# Patient Record
Sex: MALE | Race: WHITE | Employment: OTHER | URBAN - METROPOLITAN AREA
[De-identification: names, ages, dates, MRNs, and addresses within clinical notes are randomized per-mention and may not be internally consistent; named-entity substitution may affect disease eponyms.]

---

## 2019-05-07 ENCOUNTER — HOSPITAL ENCOUNTER (OUTPATIENT)
Dept: PULMONOLOGY | Age: 84
Discharge: HOME | End: 2019-05-07
Payer: MEDICARE

## 2019-05-07 ENCOUNTER — HOSPITAL ENCOUNTER (OUTPATIENT)
Dept: GENERAL RADIOLOGY | Age: 84
Discharge: HOME | End: 2019-05-07
Payer: MEDICARE

## 2019-05-07 VITALS — OXYGEN SATURATION: 91 %

## 2019-05-07 DIAGNOSIS — R06.09 DOE (DYSPNEA ON EXERTION): ICD-10-CM

## 2019-05-07 PROCEDURE — 94726 PLETHYSMOGRAPHY LUNG VOLUMES: CPT

## 2019-05-07 PROCEDURE — 94060 EVALUATION OF WHEEZING: CPT

## 2019-05-07 PROCEDURE — 94760 N-INVAS EAR/PLS OXIMETRY 1: CPT

## 2019-05-07 PROCEDURE — 6370000000 HC RX 637 (ALT 250 FOR IP): Performed by: FAMILY MEDICINE

## 2019-05-07 PROCEDURE — 94729 DIFFUSING CAPACITY: CPT

## 2019-05-07 PROCEDURE — 71046 X-RAY EXAM CHEST 2 VIEWS: CPT

## 2019-05-07 RX ORDER — ALBUTEROL SULFATE 90 UG/1
4 AEROSOL, METERED RESPIRATORY (INHALATION) ONCE
Status: COMPLETED | OUTPATIENT
Start: 2019-05-07 | End: 2019-05-07

## 2019-05-07 RX ADMIN — Medication 4 PUFF: at 08:31

## 2019-05-08 LAB
DLCO %PRED: 27 %
DLCO PRED: NORMAL ML/MIN/MMHG
DLCO/VA %PRED: NORMAL %
DLCO/VA PRED: NORMAL ML/MIN/MMHG
DLCO/VA: NORMAL ML/MIN/MMHG
DLCO: NORMAL ML/MIN/MMHG
EXPIRATORY TIME: NORMAL SEC
FEF 25-75% %PRED-PRE: NORMAL L/SEC
FEF 25-75% PRED: NORMAL L/SEC
FEF 25-75%-PRE: NORMAL L/SEC
FEV1 %PRED-PRE: 54 %
FEV1 PRED: NORMAL L
FEV1/FVC %PRED-PRE: NORMAL %
FEV1/FVC PRED: NORMAL %
FEV1/FVC: 72 %
FEV1: NORMAL L
FVC %PRED-PRE: NORMAL %
FVC PRED: NORMAL L
FVC: NORMAL L
GAW %PRED: NORMAL %
GAW PRED: NORMAL L/S/CMH2O
GAW: NORMAL L/S/CMH2O
IC %PRED: NORMAL %
IC PRED: NORMAL L
IC: NORMAL L
MVV %PRED-PRE: NORMAL %
MVV PRED: NORMAL L/MIN
MVV-PRE: NORMAL L/MIN
PEF %PRED-PRE: NORMAL L/SEC
PEF PRED: NORMAL L/SEC
PEF-PRE: NORMAL L/SEC
RAW %PRED: NORMAL %
RAW PRED: NORMAL CMH2O/L/S
RAW: NORMAL CMH2O/L/S
RV %PRED: NORMAL %
RV PRED: NORMAL L
RV: NORMAL L
SVC %PRED: NORMAL %
SVC PRED: NORMAL L
SVC: NORMAL L
TLC %PRED: 38 %
TLC PRED: NORMAL L
TLC: NORMAL L
VA %PRED: NORMAL %
VA PRED: NORMAL L
VA: NORMAL L
VTG %PRED: NORMAL %
VTG PRED: NORMAL L
VTG: NORMAL L

## 2019-05-08 ASSESSMENT — PULMONARY FUNCTION TESTS
FEV1/FVC: 72
FEV1_PERCENT_PREDICTED_PRE: 54

## 2019-05-08 NOTE — PROCEDURES
[address] [address]                               PULMONARY FUNCTION    PATIENT NAME: Sparkle Farias                :        1932  MED REC NO:   [id number]                          ROOM:  ACCOUNT NO:   [de-identified]                           ADMIT DATE: 2019  PROVIDER:     Aly Rendon MD    DATE OF PROCEDURE:  2019    This is an 80-year-old male. TEST PERFORMED:  1. Spirometry of flow-volume loops obtained before and after  bronchodilation. 2.  Lung volumes by plethysmography. 3.  Diffusion capacity of carbon monoxide. Test meets ATS criteria and the quality of flow-volume loops is  sufficient for interpretation. Good patient effort. The FEV1 is 1.21 L or 54% predicted. The FEV1 to FVC ratio is 72. Postbronchodilator, the FEV1 changed to 1.16 L or 52% predicted. Total  lung capacity is 38% predicted and diffusion is 27% predicted. INTERPRETATION:  1. Moderately severe obstruction without significant postbronchodilator  improvement. 2.  Severe reduction in lung volumes consistent with a restrictive  abnormality. 3.  Severely impaired diffusion capacity. 4.  Mixed obstruction and restriction, clinical correlation recommended. Please note that lack of bronchodilator response does not preclude the  clinical use of bronchodilators.         Chel Jj MD    D: 2019 18:47:13       T: 2019 0:06:00     [email or web address]  Job#: [id number]     Doc#: [id number]    CC:

## 2019-10-29 ENCOUNTER — HOSPITAL ENCOUNTER (OUTPATIENT)
Dept: CARDIAC REHAB | Age: 84
Setting detail: THERAPIES SERIES
Discharge: HOME | End: 2019-10-29
Payer: MEDICARE

## 2019-11-03 ENCOUNTER — APPOINTMENT (OUTPATIENT)
Dept: GENERAL RADIOLOGY | Age: 84
DRG: 291 | End: 2019-11-03
Payer: MEDICARE

## 2019-11-03 ENCOUNTER — HOSPITAL ENCOUNTER (INPATIENT)
Age: 84
LOS: 3 days | Discharge: HOME | DRG: 291 | End: 2019-11-06
Attending: EMERGENCY MEDICINE | Admitting: INTERNAL MEDICINE
Payer: MEDICARE

## 2019-11-03 PROBLEM — Z95.2 S/P AVR (AORTIC VALVE REPLACEMENT): Status: ACTIVE | Noted: 2019-11-03

## 2019-11-03 PROBLEM — J44.9 COPD (CHRONIC OBSTRUCTIVE PULMONARY DISEASE) (HCC): Status: ACTIVE | Noted: 2019-11-03

## 2019-11-03 PROBLEM — I50.33 CHF (CONGESTIVE HEART FAILURE), NYHA CLASS II, ACUTE ON CHRONIC, DIASTOLIC (HCC): Status: ACTIVE | Noted: 2019-11-03

## 2019-11-03 LAB
A/G RATIO: 0.8 (ref 1.1–2.2)
ALBUMIN SERPL-MCNC: 3.8 G/DL (ref 3.4–5)
ALP BLD-CCNC: 163 U/L (ref 40–129)
ALT SERPL-CCNC: 27 U/L (ref 10–40)
ANION GAP SERPL CALCULATED.3IONS-SCNC: 11 MMOL/L (ref 3–16)
AST SERPL-CCNC: 38 U/L (ref 15–37)
BASE EXCESS VENOUS: 6.7 MMOL/L (ref -3–3)
BASOPHILS ABSOLUTE: 0.1 K/UL (ref 0–0.2)
BASOPHILS RELATIVE PERCENT: 1.1 %
BILIRUB SERPL-MCNC: 0.5 MG/DL (ref 0–1)
BUN BLDV-MCNC: 22 MG/DL (ref 7–20)
CALCIUM SERPL-MCNC: 9.3 MG/DL (ref 8.3–10.6)
CARBOXYHEMOGLOBIN: 1.8 % (ref 0–1.5)
CHLORIDE BLD-SCNC: 94 MMOL/L (ref 99–110)
CO2: 32 MMOL/L (ref 21–32)
CREAT SERPL-MCNC: 1.3 MG/DL (ref 0.8–1.3)
EOSINOPHILS ABSOLUTE: 0.2 K/UL (ref 0–0.6)
EOSINOPHILS RELATIVE PERCENT: 2.1 %
GFR AFRICAN AMERICAN: >60
GFR NON-AFRICAN AMERICAN: 52
GLOBULIN: 4.8 G/DL
GLUCOSE BLD-MCNC: 128 MG/DL (ref 70–99)
HCO3 VENOUS: 35.1 MMOL/L (ref 23–29)
HCT VFR BLD CALC: 39.5 % (ref 40.5–52.5)
HEMOGLOBIN: 13.3 G/DL (ref 13.5–17.5)
INR BLD: 1.15 (ref 0.86–1.14)
LYMPHOCYTES ABSOLUTE: 2.3 K/UL (ref 1–5.1)
LYMPHOCYTES RELATIVE PERCENT: 23.3 %
MCH RBC QN AUTO: 33.2 PG (ref 26–34)
MCHC RBC AUTO-ENTMCNC: 33.5 G/DL (ref 31–36)
MCV RBC AUTO: 98.9 FL (ref 80–100)
METHEMOGLOBIN VENOUS: 0.6 %
MONOCYTES ABSOLUTE: 0.9 K/UL (ref 0–1.3)
MONOCYTES RELATIVE PERCENT: 9.3 %
NEUTROPHILS ABSOLUTE: 6.3 K/UL (ref 1.7–7.7)
NEUTROPHILS RELATIVE PERCENT: 64.2 %
O2 CONTENT, VEN: 12 VOL %
O2 SAT, VEN: 59 %
O2 THERAPY: ABNORMAL
PCO2, VEN: 67.4 MMHG (ref 40–50)
PDW BLD-RTO: 13.8 % (ref 12.4–15.4)
PH VENOUS: 7.33 (ref 7.35–7.45)
PLATELET # BLD: 202 K/UL (ref 135–450)
PMV BLD AUTO: 7 FL (ref 5–10.5)
PO2, VEN: 32 MMHG (ref 25–40)
POTASSIUM REFLEX MAGNESIUM: 5.2 MMOL/L (ref 3.5–5.1)
PRO-BNP: 1637 PG/ML (ref 0–449)
PROTHROMBIN TIME: 13.1 SEC (ref 9.8–13)
RAPID INFLUENZA  B AGN: NEGATIVE
RAPID INFLUENZA A AGN: NEGATIVE
RBC # BLD: 4 M/UL (ref 4.2–5.9)
SODIUM BLD-SCNC: 137 MMOL/L (ref 136–145)
TCO2 CALC VENOUS: 37 MMOL/L
TOTAL PROTEIN: 8.6 G/DL (ref 6.4–8.2)
TROPONIN: 0.01 NG/ML
WBC # BLD: 9.8 K/UL (ref 4–11)

## 2019-11-03 PROCEDURE — 96374 THER/PROPH/DIAG INJ IV PUSH: CPT

## 2019-11-03 PROCEDURE — 99291 CRITICAL CARE FIRST HOUR: CPT

## 2019-11-03 PROCEDURE — 82803 BLOOD GASES ANY COMBINATION: CPT

## 2019-11-03 PROCEDURE — 93005 ELECTROCARDIOGRAM TRACING: CPT | Performed by: PHYSICIAN ASSISTANT

## 2019-11-03 PROCEDURE — 94150 VITAL CAPACITY TEST: CPT

## 2019-11-03 PROCEDURE — 87804 INFLUENZA ASSAY W/OPTIC: CPT

## 2019-11-03 PROCEDURE — 83880 ASSAY OF NATRIURETIC PEPTIDE: CPT

## 2019-11-03 PROCEDURE — 36415 COLL VENOUS BLD VENIPUNCTURE: CPT

## 2019-11-03 PROCEDURE — 6360000002 HC RX W HCPCS: Performed by: PHYSICIAN ASSISTANT

## 2019-11-03 PROCEDURE — 85025 COMPLETE CBC W/AUTO DIFF WBC: CPT

## 2019-11-03 PROCEDURE — 96375 TX/PRO/DX INJ NEW DRUG ADDON: CPT

## 2019-11-03 PROCEDURE — 71046 X-RAY EXAM CHEST 2 VIEWS: CPT

## 2019-11-03 PROCEDURE — 84484 ASSAY OF TROPONIN QUANT: CPT

## 2019-11-03 PROCEDURE — 85610 PROTHROMBIN TIME: CPT

## 2019-11-03 PROCEDURE — 6370000000 HC RX 637 (ALT 250 FOR IP): Performed by: PHYSICIAN ASSISTANT

## 2019-11-03 PROCEDURE — 80053 COMPREHEN METABOLIC PANEL: CPT

## 2019-11-03 PROCEDURE — 1200000000 HC SEMI PRIVATE

## 2019-11-03 PROCEDURE — 94640 AIRWAY INHALATION TREATMENT: CPT

## 2019-11-03 RX ORDER — FUROSEMIDE 10 MG/ML
40 INJECTION INTRAMUSCULAR; INTRAVENOUS ONCE
Status: COMPLETED | OUTPATIENT
Start: 2019-11-03 | End: 2019-11-03

## 2019-11-03 RX ORDER — METHYLPREDNISOLONE SODIUM SUCCINATE 125 MG/2ML
125 INJECTION, POWDER, LYOPHILIZED, FOR SOLUTION INTRAMUSCULAR; INTRAVENOUS ONCE
Status: COMPLETED | OUTPATIENT
Start: 2019-11-03 | End: 2019-11-03

## 2019-11-03 RX ORDER — IPRATROPIUM BROMIDE AND ALBUTEROL SULFATE 2.5; .5 MG/3ML; MG/3ML
1 SOLUTION RESPIRATORY (INHALATION) ONCE
Status: COMPLETED | OUTPATIENT
Start: 2019-11-03 | End: 2019-11-03

## 2019-11-03 RX ADMIN — METHYLPREDNISOLONE SODIUM SUCCINATE 125 MG: 125 INJECTION, POWDER, FOR SOLUTION INTRAMUSCULAR; INTRAVENOUS at 21:40

## 2019-11-03 RX ADMIN — IPRATROPIUM BROMIDE AND ALBUTEROL SULFATE 1 AMPULE: .5; 3 SOLUTION RESPIRATORY (INHALATION) at 21:16

## 2019-11-03 RX ADMIN — FUROSEMIDE 40 MG: 10 INJECTION, SOLUTION INTRAMUSCULAR; INTRAVENOUS at 21:40

## 2019-11-03 RX ADMIN — ALBUTEROL SULFATE 5 MG: 2.5 SOLUTION RESPIRATORY (INHALATION) at 21:19

## 2019-11-03 ASSESSMENT — ENCOUNTER SYMPTOMS
ABDOMINAL PAIN: 0
VOMITING: 0
EYES NEGATIVE: 1
COLOR CHANGE: 0
COUGH: 1
NAUSEA: 0
SHORTNESS OF BREATH: 1

## 2019-11-03 ASSESSMENT — PAIN DESCRIPTION - LOCATION
LOCATION: BACK
LOCATION: BACK

## 2019-11-03 ASSESSMENT — PAIN DESCRIPTION - FREQUENCY: FREQUENCY: INTERMITTENT

## 2019-11-03 ASSESSMENT — PAIN DESCRIPTION - PAIN TYPE
TYPE: ACUTE PAIN
TYPE: ACUTE PAIN

## 2019-11-03 ASSESSMENT — PAIN DESCRIPTION - ORIENTATION: ORIENTATION: MID

## 2019-11-03 ASSESSMENT — PAIN SCALES - GENERAL
PAINLEVEL_OUTOF10: 5
PAINLEVEL_OUTOF10: 5

## 2019-11-03 NOTE — LETTER
Beneficiary Notification Letter     This [facility] Provider is Participating in an Innovative Payment and [address] [place] [address] [place] from Medicare     Greetings:   [facility] is participating in a Medicare initiative called the [facility] for [address] [facility]. You are receiving this letter because your health care provider has identified you as a patient who is receiving care through this initiative. Health care providers participating in the [facility] [address] [facility], including [facility], will work with Medicare to improve care for patients. Your Medicare rights have not been changed. You still have all the same Medicare rights and protections, including the right to choose which hospital, doctor, or other health care provider you see. However, because [facility] chose to participate in the [address] [place] [address], all Medicare beneficiaries who meet the eligibility criteria of this initiative will receive care under the initiative. If you do not wish to receive care under the Bundled Payments [facility] [address] [facility], you must choose a health care provider that does not participate in this initiative for your care. Regardless of which health care provider you see, Medicare will continue to cover all of your medically necessary services. Bundled Payments for Care Improvement Advanced aims to help improve your care     The Bundled Payments [facility] [address] [facility] is an innovative Medicare initiative that encourages your doctors, hospitals, and other health care providers to work more closely together so you get better care during and following certain hospital stays.  In this initiative, doctors and hospitals may work closely with certain health care providers and suppliers that help patients recover after discharge from the hospital, including skilled nursing facilities, home health agencies, inpatient rehabilitation facilities, and long term care hospitals. [facility] is working closely with the doctors and other health care providers that care for you during and following your hospital stay and for a period of time after you leave the hospital. By working together, the health care providers are trying to more efficiently provide well-managed, high quality, patient-centered care as you undergo treatment. Hospitals, doctors, and other health care providers that care for you following a hospital stay may receive an additional payment for providing better, more coordinated health care. Medicare will monitor your care to make sure you and others get high quality care. Your feedback is important     Medicare may also ask you to answer a survey about the services and care you received from Kristel Carl will be mailed to you. Your feedback will improve care for all people with Medicare who receive care from [facility]. Completion of this survey is optional.     Get more information     For more information about the Bundled Payments for [address] [place] [address], you can:    · Visit the CMS BPCI Advanced Website at http://hardin-young.net/ initiatives/bpci-advanced   · Call the [facility] team at (532) [phone]. · Call 1-800-MEDICARE (7-234.971.2088). TTY users can call 9-618.867.6842     If you have concerns or complaints about your care, talk to your health care provider, or contact your Beneficiary and Family Centered Quality Improvement Organization [facility]). To get your [facility]'s phone number, visit Medicare.gov/contacts or call 1-800-MEDICARE. · To find a different hospital, visit www. hospitalcompare.[facility].gov or call 1-800[employer] MEDICARE (1-265.349.4481). TTY users should call 4-921.628.3525. · To find a different doctor, visit Medicare's Physician Compare website, HDTapes.co.nz, or call 1-800-MEDICARE (970 6350). TTY users should call 5-795.109.6376. · To find a different skilled nursing facility, visit [address] [facility] [address] website, https://www.SocietyOne/, or call 1-800-MEDICARE (1- 324.440.5119). TTY users should call 7-331.360.8527. · To find a different long term care hospital, visit [facility] [address] Compare website, SmTicieslogLinguastat.be, or call 1-800- MEDICARE (814 5196). TTY users should call 9-787.414.6281. · To find a different inpatient rehabilitation facility, visit [address] [facility] Compare website, www.medicare.gov/ inpatientrehabilitation facilitycompare, or call 1-800-MEDICARE (7-866.462.5166). TTY users should call 0- 593.195.3143. · To find a different home health agency, visit [facility] website, www.medicare.gov/homehealthcompare, or call 1-800-MEDICARE (2-391- 423-2400). TTY users should call [phone].

## 2019-11-04 LAB
ANION GAP SERPL CALCULATED.3IONS-SCNC: 13 MMOL/L (ref 3–16)
BASOPHILS ABSOLUTE: 0 K/UL (ref 0–0.2)
BASOPHILS RELATIVE PERCENT: 0.2 %
BUN BLDV-MCNC: 20 MG/DL (ref 7–20)
CALCIUM SERPL-MCNC: 9 MG/DL (ref 8.3–10.6)
CHLORIDE BLD-SCNC: 91 MMOL/L (ref 99–110)
CO2: 31 MMOL/L (ref 21–32)
CREAT SERPL-MCNC: 1.2 MG/DL (ref 0.8–1.3)
EKG ATRIAL RATE: 91 BPM
EKG DIAGNOSIS: NORMAL
EKG P AXIS: 43 DEGREES
EKG P-R INTERVAL: 228 MS
EKG Q-T INTERVAL: 382 MS
EKG QRS DURATION: 126 MS
EKG QTC CALCULATION (BAZETT): 469 MS
EKG R AXIS: -54 DEGREES
EKG T AXIS: 19 DEGREES
EKG VENTRICULAR RATE: 91 BPM
EOSINOPHILS ABSOLUTE: 0 K/UL (ref 0–0.6)
EOSINOPHILS RELATIVE PERCENT: 0.1 %
GFR AFRICAN AMERICAN: >60
GFR NON-AFRICAN AMERICAN: 57
GLUCOSE BLD-MCNC: 183 MG/DL (ref 70–99)
HCT VFR BLD CALC: 39.9 % (ref 40.5–52.5)
HEMOGLOBIN: 13.6 G/DL (ref 13.5–17.5)
LYMPHOCYTES ABSOLUTE: 0.3 K/UL (ref 1–5.1)
LYMPHOCYTES RELATIVE PERCENT: 3.4 %
MCH RBC QN AUTO: 33.5 PG (ref 26–34)
MCHC RBC AUTO-ENTMCNC: 34.1 G/DL (ref 31–36)
MCV RBC AUTO: 98.3 FL (ref 80–100)
MONOCYTES ABSOLUTE: 0.1 K/UL (ref 0–1.3)
MONOCYTES RELATIVE PERCENT: 1.4 %
NEUTROPHILS ABSOLUTE: 8.2 K/UL (ref 1.7–7.7)
NEUTROPHILS RELATIVE PERCENT: 94.9 %
PDW BLD-RTO: 14.1 % (ref 12.4–15.4)
PLATELET # BLD: 193 K/UL (ref 135–450)
PMV BLD AUTO: 7.3 FL (ref 5–10.5)
POTASSIUM REFLEX MAGNESIUM: 3.9 MMOL/L (ref 3.5–5.1)
PROCALCITONIN: 0.06 NG/ML (ref 0–0.15)
RBC # BLD: 4.06 M/UL (ref 4.2–5.9)
SODIUM BLD-SCNC: 135 MMOL/L (ref 136–145)
TROPONIN: <0.01 NG/ML
TROPONIN: <0.01 NG/ML
WBC # BLD: 8.7 K/UL (ref 4–11)

## 2019-11-04 PROCEDURE — 2580000003 HC RX 258: Performed by: NURSE PRACTITIONER

## 2019-11-04 PROCEDURE — 1200000000 HC SEMI PRIVATE

## 2019-11-04 PROCEDURE — 99222 1ST HOSP IP/OBS MODERATE 55: CPT | Performed by: INTERNAL MEDICINE

## 2019-11-04 PROCEDURE — 6370000000 HC RX 637 (ALT 250 FOR IP): Performed by: NURSE PRACTITIONER

## 2019-11-04 PROCEDURE — 84484 ASSAY OF TROPONIN QUANT: CPT

## 2019-11-04 PROCEDURE — 80048 BASIC METABOLIC PNL TOTAL CA: CPT

## 2019-11-04 PROCEDURE — 93010 ELECTROCARDIOGRAM REPORT: CPT | Performed by: INTERNAL MEDICINE

## 2019-11-04 PROCEDURE — 94761 N-INVAS EAR/PLS OXIMETRY MLT: CPT

## 2019-11-04 PROCEDURE — 84145 PROCALCITONIN (PCT): CPT

## 2019-11-04 PROCEDURE — 6360000002 HC RX W HCPCS: Performed by: NURSE PRACTITIONER

## 2019-11-04 PROCEDURE — 2700000000 HC OXYGEN THERAPY PER DAY

## 2019-11-04 PROCEDURE — 6370000000 HC RX 637 (ALT 250 FOR IP)

## 2019-11-04 PROCEDURE — 85025 COMPLETE CBC W/AUTO DIFF WBC: CPT

## 2019-11-04 RX ORDER — FUROSEMIDE 40 MG/1
40 TABLET ORAL DAILY
Status: DISCONTINUED | OUTPATIENT
Start: 2019-11-04 | End: 2019-11-06 | Stop reason: HOSPADM

## 2019-11-04 RX ORDER — SODIUM CHLORIDE 0.9 % (FLUSH) 0.9 %
10 SYRINGE (ML) INJECTION EVERY 12 HOURS SCHEDULED
Status: DISCONTINUED | OUTPATIENT
Start: 2019-11-04 | End: 2019-11-06 | Stop reason: HOSPADM

## 2019-11-04 RX ORDER — M-VIT,TX,IRON,MINS/CALC/FOLIC 27MG-0.4MG
1 TABLET ORAL DAILY
Status: DISCONTINUED | OUTPATIENT
Start: 2019-11-04 | End: 2019-11-06 | Stop reason: HOSPADM

## 2019-11-04 RX ORDER — IPRATROPIUM BROMIDE AND ALBUTEROL SULFATE 2.5; .5 MG/3ML; MG/3ML
1 SOLUTION RESPIRATORY (INHALATION)
Status: DISCONTINUED | OUTPATIENT
Start: 2019-11-04 | End: 2019-11-04

## 2019-11-04 RX ORDER — PREDNISONE 20 MG/1
40 TABLET ORAL DAILY
Status: DISCONTINUED | OUTPATIENT
Start: 2019-11-04 | End: 2019-11-06 | Stop reason: HOSPADM

## 2019-11-04 RX ORDER — CHOLECALCIFEROL (VITAMIN D3) 125 MCG
5 CAPSULE ORAL NIGHTLY PRN
Status: DISCONTINUED | OUTPATIENT
Start: 2019-11-04 | End: 2019-11-06 | Stop reason: HOSPADM

## 2019-11-04 RX ORDER — ACETAMINOPHEN 325 MG/1
650 TABLET ORAL EVERY 4 HOURS PRN
Status: DISCONTINUED | OUTPATIENT
Start: 2019-11-04 | End: 2019-11-06 | Stop reason: HOSPADM

## 2019-11-04 RX ORDER — ONDANSETRON 2 MG/ML
4 INJECTION INTRAMUSCULAR; INTRAVENOUS EVERY 6 HOURS PRN
Status: DISCONTINUED | OUTPATIENT
Start: 2019-11-04 | End: 2019-11-06 | Stop reason: HOSPADM

## 2019-11-04 RX ORDER — SIMVASTATIN 40 MG
40 TABLET ORAL NIGHTLY
Status: DISCONTINUED | OUTPATIENT
Start: 2019-11-04 | End: 2019-11-06 | Stop reason: HOSPADM

## 2019-11-04 RX ORDER — SODIUM CHLORIDE 0.9 % (FLUSH) 0.9 %
10 SYRINGE (ML) INJECTION PRN
Status: DISCONTINUED | OUTPATIENT
Start: 2019-11-04 | End: 2019-11-06 | Stop reason: HOSPADM

## 2019-11-04 RX ORDER — FERROUS GLUCONATE 324(37.5)
324 TABLET ORAL
Status: DISCONTINUED | OUTPATIENT
Start: 2019-11-04 | End: 2019-11-06 | Stop reason: HOSPADM

## 2019-11-04 RX ORDER — PANTOPRAZOLE SODIUM 40 MG/1
40 TABLET, DELAYED RELEASE ORAL
Status: DISCONTINUED | OUTPATIENT
Start: 2019-11-04 | End: 2019-11-06 | Stop reason: HOSPADM

## 2019-11-04 RX ORDER — IPRATROPIUM BROMIDE AND ALBUTEROL SULFATE 2.5; .5 MG/3ML; MG/3ML
1 SOLUTION RESPIRATORY (INHALATION) EVERY 4 HOURS PRN
Status: DISCONTINUED | OUTPATIENT
Start: 2019-11-04 | End: 2019-11-06 | Stop reason: HOSPADM

## 2019-11-04 RX ORDER — ASPIRIN 81 MG/1
81 TABLET ORAL DAILY
Status: DISCONTINUED | OUTPATIENT
Start: 2019-11-04 | End: 2019-11-06 | Stop reason: HOSPADM

## 2019-11-04 RX ADMIN — PANTOPRAZOLE SODIUM 40 MG: 40 TABLET, DELAYED RELEASE ORAL at 06:49

## 2019-11-04 RX ADMIN — Medication 10 ML: at 20:58

## 2019-11-04 RX ADMIN — Medication: at 07:55

## 2019-11-04 RX ADMIN — METOPROLOL TARTRATE 25 MG: 25 TABLET ORAL at 20:54

## 2019-11-04 RX ADMIN — Medication 10 ML: at 09:06

## 2019-11-04 RX ADMIN — FUROSEMIDE 40 MG: 40 TABLET ORAL at 09:05

## 2019-11-04 RX ADMIN — PREDNISONE 40 MG: 20 TABLET ORAL at 09:05

## 2019-11-04 RX ADMIN — ASPIRIN 81 MG: 81 TABLET ORAL at 09:05

## 2019-11-04 RX ADMIN — SIMVASTATIN 40 MG: 40 TABLET, FILM COATED ORAL at 20:54

## 2019-11-04 RX ADMIN — Medication 1 TABLET: at 09:05

## 2019-11-04 RX ADMIN — ENOXAPARIN SODIUM 40 MG: 40 INJECTION SUBCUTANEOUS at 09:05

## 2019-11-04 RX ADMIN — METOPROLOL TARTRATE 25 MG: 25 TABLET ORAL at 09:05

## 2019-11-04 RX ADMIN — Medication 324 MG: at 09:05

## 2019-11-04 ASSESSMENT — PAIN SCALES - GENERAL
PAINLEVEL_OUTOF10: 0

## 2019-11-04 NOTE — PROGRESS NOTES
adhesions, excision of sigmoid epiploica    MITRAL VALVE REPLACEMENT      PACEMAKER INSERTION         Level of Consciousness: Alert, Oriented, and Cooperative = 0    Level of Activity: Walking with assistance = 1    Respiratory Pattern: Regular Pattern; RR 8-20 = 0    Breath Sounds: Diminshed bilaterally and/or crackles = 2    Sputum   ,  ,    Cough: Strong, spontaneous, non-productive = 0    Vital Signs   /73   Pulse 99   Temp 98 °F (36.7 °C) (Oral)   Resp 18   Ht 5' 9\" (1.753 m)   Wt 175 lb 12.8 oz (79.7 kg)   SpO2 95%   BMI 25.96 kg/m²   SPO2 (COPD values may differ): 90-91% on room air or greater than 92% on FiO2 24- 28% = 1    Peak Flow (asthma only): not applicable = 0    RSI: 5-6 = Q4hr PRN (every four hours as needed) for dyspnea        Plan       Goals: medication delivery, mobilize retained secretions, volume expansion and improve oxygenation    Patient/caregiver was educated on the proper method of use for Respiratory Care Devices:  Yes      Level of patient/caregiver understanding able to:   ? Verbalize understanding   ? Demonstrate understanding       ? Teach back        ? Needs reinforcement       ? No available caregiver               ? Other:     Response to education:  [address]     Is patient being placed on Home Treatment Regimen? No     Does the patient have everything they need prior to discharge? NA     Comments: pt seen on admission and in the ED    Plan of Care: [facility] Q4 PRN     Electronically signed by Adrienne Rodriguez RCP on 11/4/2019 at 2:45 AM    Respiratory Protocol Guidelines     1. Assessment and treatment by Respiratory Therapy will be initiated for medication and therapeutic interventions upon initiation of aerosolized medication. 2. Physician will be contacted for respiratory rate (RR) greater than 35 breaths per minute. Therapy will be held for heart rate (HR) greater than 140 beats per minute, pending direction from physician.   3. Bronchodilators will be administered via Metered Dose Inhaler (MDI) with spacer when the following criteria are met:  a. Alert and cooperative     b. HR < 140 bpm  c. RR < 30 bpm                d. Can demonstrate a 2-3 second inspiratory hold  4. Bronchodilators will be administered via Hand Held Nebulizer JOV[facility]) to patients when ANY of the following criteria are met  a. Incognizant or uncooperative          b. Patients treated with HHN at Home        c. Unable to demonstrate proper use of MDI with spacer     d. RR > 30 bpm   5. Bronchodilators will be delivered via Metered Dose Inhaler (MDI), HHN, Aerogen to intubated patients on mechanical ventilation. 6. Inhalation medication orders will be delivered and/or substituted as outlined below. Aerosolized Medications Ordering and Administration Guidelines:    1. All Medications will be ordered by a physician, and their frequency and/or modality will be adjusted as defined by the patients Respiratory Severity Index (RSI) score. 2. If the patient does not have documented COPD, consider discontinuing anticholinergics when RSI is less than 9.  3. If the bronchospasm worsens (increased RSI), then the bronchodilator frequency can be increased to a maximum of every 4 hours. If greater than every 4 hours is required, the physician will be contacted. 4. If the bronchospasm improves, the frequency of the bronchodilator can be decreased, based on the patient's RSI, but not less than home treatment regimen frequency. 5. Bronchodilator(s) will be discontinued if patient has a RSI less than 9 and has received no scheduled or as needed treatment for 72  Hrs. Patients Ordered on a Mucolytic Agent:    1. Must always be administered with a bronchodilator. 2. Discontinue if patient experiences worsened bronchospasm, or secretions have lessened to the point that the patient is able to clear them with a cough. Anti-inflammatory and Combination Medications:    1.  If the patient lacks prior history

## 2019-11-04 NOTE — CARE COORDINATION
[facility]    Referral received from CM to follow for home care services. I will follow for needs, and speak with patient to verify demos. COPD . ...  May need HCV program, if needed please add this into the home care order    - Recommend patient for [address]  Work mobile: [phone]  [facility] office: [phone]

## 2019-11-04 NOTE — CONSULTS
needed for Pain    Historical Provider, MD   CO ENZYME Q-10 PO Take 1 tablet by mouth daily    Historical Provider, MD   melatonin 3 MG TABS tablet Take 5 mg by mouth daily. Historical Provider, MD   Multiple Vitamins-Minerals (THERAPEUTIC MULTIVITAMIN-MINERALS) tablet Take 1 tablet by mouth daily. Historical Provider, MD   Omega-3 Fatty Acids (FISH OIL) 1200 MG CAPS Take  by mouth. Historical Provider, MD   S[place] 500 MG CAPS Take  by mouth. Historical Provider, MD   Flaxseed, Linseed, (FLAXSEED OIL) 1200 MG CAPS Take  by mouth. Historical Provider, MD   ferrous gluconate (FERGON) 324 (38 FE) MG tablet Take 324 mg by mouth daily (with breakfast)     Historical Provider, MD   ALPHA LIPOIC ACID-CR-CINNAMON PO Take 100 mg by mouth. Historical Provider, MD   metoprolol (LOPRESSOR) 25 MG tablet Take 1 tablet by mouth 2 times daily. 2/6/14   Martha Guerrero MD   simvastatin (ZOCOR) 40 MG tablet Take 40 mg by mouth nightly. Historical Provider, MD   esomeprazole (NEXIUM) 40 MG capsule Take 40 mg by mouth every morning (before breakfast). Historical Provider, MD   nitroGLYCERIN (NITROSTAT) 0.4 MG SL tablet Place 1 tablet under the tongue every 5 minutes as needed for Chest pain for 30 days. 10/30/13 11/21/15  Prudence VanndaleMD dianna   aspirin 81 MG EC tablet Take 81 mg by mouth daily. Historical Provider, MD        Allergies:  Ambien  [zolpidem tartrate]     Review of Systems:   All 14 point review of symptoms completed. Pertinent positives identified in the HPI, all other review of symptoms negative as below.     ·     Physical Examination:    Vitals:    11/04/19 1211   BP: 112/64   Pulse: 96   Resp: 16   Temp: 97.9 °F (36.6 °C)   SpO2: 96%    Weight: 175 lb 12.8 oz (79.7 kg)         General Appearance:  Alert, cooperative, no distress, appears stated age   Head:  Normocephalic, without obvious abnormality, atraumatic   Eyes:  PERRL, conjunctiva/corneas clear       Nose: Nares normal   Throat: Lips, mucosa, and tongue normal   Neck: Supple, symmetrical, trachea midline, no carotid bruit or JVD       Lungs:    Slight bibasilar rales right greater than left, respirations unlabored. Patient on 4 L of oxygen, uses 2 at home   Chest Wall:  No tenderness or deformity   Heart:  Regular rate and rhythm, S1, S2 normal, no murmur, rub or gallop   Abdomen:   Soft, non-tender, bowel sounds active           Extremities: Extremities normal, atraumatic, no cyanosis. 1+ lower extremity edema   Pulses: 2+ and symmetric   Skin: Skin color, texture, turgor normal, no rashes or lesions   Pysch: Normal mood and affect   Neurologic: Normal gross motor and sensory exam.         Labs  CBC:   Lab Results   Component Value Date    WBC 8.7 11/04/2019    RBC 4.06 11/04/2019    HGB 13.6 11/04/2019    HCT 39.9 11/04/2019    MCV 98.3 11/04/2019    RDW 14.1 11/04/2019     11/04/2019     CMP:    Lab Results   Component Value Date     11/04/2019    K 3.9 11/04/2019    CL 91 11/04/2019    CO2 31 11/04/2019    BUN 20 11/04/2019    CREATININE 1.2 11/04/2019    GFRAA >60 11/04/2019    AGRATIO 0.8 11/03/2019    LABGLOM 57 11/04/2019    GLUCOSE 183 11/04/2019    PROT 8.6 11/03/2019    CALCIUM 9.0 11/04/2019    BILITOT 0.5 11/03/2019    ALKPHOS 163 11/03/2019    AST 38 11/03/2019    ALT 27 11/03/2019     PT/INR:  No results found for: PTINR  Lab Results   Component Value Date    TROPONINI <0.01 11/04/2019       EKG:  I have reviewed EKG with the following interpretation:  Impression: Sinus rhythm with first-degree AV block, regular rate, LAD, Bifasicular block, right bundle branch block, criteria not met for ischemia. CXR: Bibasilar atelectasis with right-sided pleural effusion.     Assessment  Patient Active Problem List   Diagnosis    Severe aortic stenosis    Laceration of head    Abnormal EKG    Acute DVT (deep venous thrombosis) (HCC)    Syncope and collapse    CAD (coronary artery disease)    Hyperlipidemia    Chest pressure    CHF (congestive heart failure), NYHA class II, acute on chronic, diastolic (HCC)    S/P AVR (aortic valve replacement)    COPD (chronic obstructive pulmonary disease) ([facility])         Plan:    Acute respiratory failure: Likely secondary to decompensated diastolic CHF, and COPD exacerbation. (Improving)  - See plan for CHF and COPD    Acute on chronic decompensated diastolic CHF:  -Continue Lasix  -Echo    COPD exacerbation:  -Per medicine    CAD:  - Continue beta-blocker, ASA, and statin. Hyperlipidemia:  -Continue home meds    Esther Rodarte DO PGY-2  11/4/2019 3:02 PM      Pt seen/examined w/ resident, I performed my own history, physical, pt presented w/ cp/sob, feels better today.   Gen: sitting in chair, nad  Cv: rrr s1 s2 2/6 sm  Neck: elev jvp  Chest: decrs bs  Abd soft  Ext: +1 le edema    Plan, diurese, get echo

## 2019-11-04 NOTE — H&P
Hospital Medicine History & Physical      PCP: William Mahajan MD    Date of Admission: 11/3/2019    Date of Service: Pt seen/examined on 11/3/2019 and Admitted to Inpatient with expected LOS greater than two midnights due to medical therapy. Chief Complaint:    Chief Complaint   Patient presents with    Shortness of Breath     pt states SOB worse this afternoon with painful deep breaths, new back pain      History Of Present Illness:      80 y.o. male, with PMH of HLD, CHF, interstitial lung disease with O2 dependence, CAD s/p CABG, aortic stenosis s/p AVR, DVT, pacemaker for bradycardia, and GERD, who presented to [facility] with increasing shortness of breath. History was obtained from the patient and review of the EMR. The patient began feeling more short of breath this afternoon progressively worsened throughout the day. He began to have some lower midsternal chest pain and pain in his back along with shortness of breath and called EMS to be brought to the ER for further evaluation. The patient states that his symptoms worsened with activity, but did not dissipate with rest.  The patient was noted to be hypoxic in the 80s with his normal 2 L of oxygen. The ED bumped him up to 4 L and this was adequate in maintaining his oxygen saturation in the 90s. The patient states that his pain in his chest and back worsens with inspiration and also with movement. Of note, the patient did have an echo on 8/14/2019 that revealed vigorous left ventricle systolic function with EF of 65 to 70%. Dynamic obstruction in the mid cavity. Abnormal left ventricular relaxation, grade 1 diastolic dysfunction. Bioprosthetic valve present in aortic valve. Moderate focal thickening and calcification of anterior leaflet and posterior leaflet of the mitral valve. Pacer wire noted in the right ventricle.     He was hospitalized in September 2019 for similar symptoms and was found to have interstitial lung disease, gluconate (FERGON) 324 (38 FE) MG tablet Take 324 mg by mouth daily (with breakfast)     Historical Provider, MD   ALPHA LIPOIC ACID-CR-CINNAMON PO Take 100 mg by mouth. Historical Provider, MD   metoprolol (LOPRESSOR) 25 MG tablet Take 1 tablet by mouth 2 times daily. 2/6/14   Liz Dias MD   simvastatin (ZOCOR) 40 MG tablet Take 40 mg by mouth nightly. Historical Provider, MD   esomeprazole (NEXIUM) 40 MG capsule Take 40 mg by mouth every morning (before breakfast). Historical Provider, MD   nitroGLYCERIN (NITROSTAT) 0.4 MG SL tablet Place 1 tablet under the tongue every 5 minutes as needed for Chest pain for 30 days. 10/30/13 11/21/15  Mary Tijerina MD   aspirin 81 MG EC tablet Take 81 mg by mouth daily. Historical Provider, MD       Allergies:  Ambien  [zolpidem tartrate]    Social History:      The patient currently lives independently. TOBACCO:   reports that he quit smoking about 47 years ago. He has never used smokeless tobacco.  ETOH:   reports that he does not drink alcohol. Family History:      Reviewed in detail. Positive as follows:        Problem Relation Age of Onset    Heart Disease Brother        REVIEW OF SYSTEMS:   Pertinent positives as noted in the HPI. All other systems reviewed and negative. PHYSICAL EXAM PERFORMED:    /73   Pulse 99   Temp 98 °F (36.7 °C) (Oral)   Resp 18   Ht 5' 9\" (1.753 m)   Wt 175 lb 12.8 oz (79.7 kg)   SpO2 95%   BMI 25.96 kg/m²     General appearance:  Pleasant, elderly male in no apparent distress, appears stated age and cooperative. HEENT:  Normal cephalic, atraumatic without obvious deformity. Pupils equal, round, and reactive to light. Extra ocular muscles intact. Conjunctivae/corneas clear. Neck: Supple, with full range of motion. No jugular venous distention. Trachea midline. Respiratory:  Increased respiratory effort. Decreased breath sounds, rales noted in bilateral bases.   Cardiovascular:  Regular rate and rhythm with normal S1/S2 without murmurs, rubs or gallops. Strong pulsation at aortic area. Abdomen: Soft, non-tender, non-distended with normal bowel sounds. Musculoskeletal:  No clubbing, cyanosis or edema bilaterally. Full range of motion without deformity. Skin: Skin color, texture, turgor normal.  No rashes or lesions. Neurologic:  Neurovascularly intact without any focal sensory/motor deficits. Cranial nerves: II-XII intact, grossly non-focal.  Psychiatric:  Alert and oriented, thought content appropriate, normal insight  Capillary Refill: Brisk,< 3 seconds   Peripheral Pulses: +2 palpable, equal bilaterally       Labs:     Recent Labs     11/03/19 2101   WBC 9.8   HGB 13.3*   HCT 39.5*        Recent Labs     11/03/19 2101      K 5.2*   CL 94*   CO2 32   BUN 22*   CREATININE 1.3   CALCIUM 9.3     Recent Labs     11/03/19 2101   AST 38*   ALT 27   BILITOT 0.5   ALKPHOS 163*     Recent Labs     11/03/19 2101   INR 1.15*     Recent Labs     11/03/19 2101 11/04/19  0017   TROPONINI 0.01 <0.01       Urinalysis:      Lab Results   Component Value Date    NITRU Negative 11/19/2014    BLOODU SMALL 11/19/2014    SPECGRAV >=1.030 11/19/2014    GLUCOSEU Negative 11/19/2014       Radiology:     CXR: I have reviewed the CXR with the following interpretation: Vascular congestion with small right effusion and basilar atelectasis. Bilateral mixed interstitial and airspace opacities. EKG:  I have reviewed the EKG with the following interpretation: NSR with first-degree AV block, possible left atrial enlargement, right bundle branch block, left anterior fascicular block. Pacer. Rate of 91. XR CHEST STANDARD (2 VW)   Preliminary Result   Vascular congestion with small right effusion and basilar atelectasis.              ASSESSMENT:    Active Hospital Problems    Diagnosis Date Noted    CHF (congestive heart failure), NYHA class II, acute on chronic, diastolic (HCC) [O87.82] 78/95/7253    S/P AVR (aortic

## 2019-11-04 NOTE — ED PROVIDER NOTES
I personally interviewed and examined this patient, discussed the findings, diagnostic studies, interventions and treatment plan with LEA. . I reviewed the clinical notes and test results. I personally supervised all pertinent procedures performed on the patient by the LEA throughout the course and was available to manage complications as they arose, if applicable. I agree with the assessment, management and disposition as presented by the LEA with exceptions/corrections as documented. Patient went to [place] this morning and states upon getting home being feeling more and more short of breath. Worse throughout the day. Is a letting pain across his lower chest and back. It is worse when he was around. Here in the ER his flu swab is negative is white blood cell count was Normal Without a bandemia. It's just for show some vascular congestion is very short of breath is hypoxic 86% on room air. I do think in light of his patient should be admitted further evaluation management and I can only be any developing a worsening respiratory infection. For further details of this emergency department encounter, please see the LEA's documentation.       ED Triage Vitals [11/03/19 2058]   BP Temp Temp Source Pulse Resp SpO2 Height Weight   (!) 140/77 97.8 °F (36.6 °C) Oral 87 22 (!) 86 % -- --        Results for orders placed or performed during the hospital encounter of 11/03/19   Rapid influenza A/B antigens   Result Value Ref Range    Rapid Influenza A Ag Negative Negative    Rapid Influenza B Ag Negative Negative   CBC Auto Differential   Result Value Ref Range    WBC 9.8 4.0 - 11.0 K/uL    RBC 4.00 (L) 4.20 - 5.90 M/uL    Hemoglobin 13.3 (L) 13.5 - 17.5 g/dL    Hematocrit 39.5 (L) 40.5 - 52.5 %    MCV 98.9 80.0 - 100.0 fL    MCH 33.2 26.0 - 34.0 pg    MCHC 33.5 31.0 - 36.0 g/dL    RDW 13.8 12.4 - 15.4 %    Platelets 814 101 - 919 K/uL    MPV 7.0 5.0 - 10.5 fL    Neutrophils % 64.2 %    Lymphocytes % 23.3 % Monocytes % 9.3 %    Eosinophils % 2.1 %    Basophils % 1.1 %    Neutrophils Absolute 6.3 1.7 - 7.7 K/uL    Lymphocytes Absolute 2.3 1.0 - 5.1 K/uL    Monocytes Absolute 0.9 0.0 - 1.3 K/uL    Eosinophils Absolute 0.2 0.0 - 0.6 K/uL    Basophils Absolute 0.1 0.0 - 0.2 K/uL   Protime-INR   Result Value Ref Range    Protime 13.1 (H) 9.8 - 13.0 sec    INR 1.15 (H) 0.86 - 1.14   Blood gas, venous   Result Value Ref Range    pH, Sixto 7.334 (L) 7.350 - 7.450    pCO2, Sixto 67.4 (H) 40.0 - 50.0 mmHg    pO2, Sixto 32.0 25.0 - 40.0 mmHg    HCO3, Venous 35.1 (H) 23.0 - 29.0 mmol/L    Base Excess, Sixto 6.7 (H) -3.0 - 3.0 mmol/L    O2 Sat, Sixto 59 Not Established %    Carboxyhemoglobin 1.8 (H) 0.0 - 1.5 %    MetHgb, Sixto 0.6 <1.5 %    TC02 (Calc), Sixto 37 Not Established mmol/L    O2 Content, Sixto 12 Not Established VOL %    O2 Therapy Unknown        XR CHEST STANDARD (2 VW)   Preliminary Result   Vascular congestion with small right effusion and basilar atelectasis. CLINICAL IMPRESSION  1. Hypoxia    2. Shortness of breath        Blood pressure (!) 140/77, pulse 87, temperature 97.8 °F (36.6 °C), temperature source Oral, resp. rate 16, SpO2 (!) 86 %. Vikash Gaona was admitted in stable condition. This chart was generated in part by using Dragon Dictation system and may contain errors related to that system including errors in grammar, punctuation, and spelling, as well as words and phrases that may be inappropriate. If there are any questions or concerns please feel free to contact the dictating provider for clarification.      Martha Vela DO  ATTENDING, EMERGENCY MEDICINE       Rasheeda Sensor, DO  11/12/19 [address] [facility],[address], DO  11/12/19 7218

## 2019-11-04 NOTE — PROGRESS NOTES
Hospitalist Progress Note      PCP: William Mahajan MD    Date of Admission: 11/3/2019    Chief Complaint: shortness of breath     Hospital Course: admitted with shortness of breath. Diagnosed with COPD exacerbation and congestive heart failure. Subjective: no chest pain, no nausea or vomiting, some shortness of breath on exertion        Medications:  Reviewed    Infusion Medications   Scheduled Medications    aspirin  81 mg Oral Daily    pantoprazole  40 mg Oral QAM AC    ferrous gluconate  324 mg Oral Daily with breakfast    metoprolol tartrate  25 mg Oral BID    therapeutic multivitamin-minerals  1 tablet Oral Daily    simvastatin  40 mg Oral Nightly    sodium chloride flush  10 mL Intravenous 2 times per day    enoxaparin  40 mg Subcutaneous Daily    predniSONE  40 mg Oral Daily    furosemide  40 mg Oral Daily     PRN Meds: melatonin, sodium chloride flush, magnesium hydroxide, ondansetron, acetaminophen, ipratropium-albuterol, perflutren lipid microspheres      Intake/Output Summary (Last 24 hours) at 11/4/2019 1601  Last data filed at 11/4/2019 1422  Gross per 24 hour   Intake 790 ml   Output 1890 ml   Net -1100 ml       Physical Exam Performed:    /64   Pulse 96   Temp 97.9 °F (36.6 °C) (Oral)   Resp 16   Ht 5' 9\" (1.753 m)   Wt 175 lb 12.8 oz (79.7 kg)   SpO2 96%   BMI 25.96 kg/m²     General appearance: No apparent distress, appears stated age and cooperative. HEENT: Pupils equal, round, and reactive to light. Conjunctivae/corneas clear. Neck: Supple, with full range of motion. No jugular venous distention. Trachea midline. Respiratory:  Normal respiratory effort. Bibasilar inspiratory crcakles  Cardiovascular: Regular rate and rhythm with normal S1/S2 without murmurs, rubs or gallops. Abdomen: Soft, non-tender, non-distended with normal bowel sounds. Musculoskeletal: No clubbing or cyanosis. 2+ edema bilaterally. Full range of motion without deformity.   Skin: Skin color,

## 2019-11-04 NOTE — CARE COORDINATION
CASE MANAGEMENT INITIAL ASSESSMENT      Reviewed chart and met with patient today, re: Possible DCP needs  Explained Case Management role/services. Family present: None  Primary contact information: Son [phone]    Admit date/status: 11/3/19 IP  Diagnosis: CHF    Insurance: Medicare  Precert required for SNF - N        3 night stay required - Y    Living arrangements, Adls, care needs, prior to admission: Lives in a two story house resides on first floor. Independent in ADL's   Transportation: Family    Durable Medical Equipment at home: Walker__Cane__RTS__ BSC__Shower Chair__  02_X Patient Aides 2L cont_ HHN__ CPAP_X_  BiPap__  Hospital Bed__ W/C___ Other__________    Services in the home and/or outpatient, prior to admission: Has a private pay cleaning lady once q3 weeks. Dialysis Facility (if applicable)N/A   · Name:  · Address:  · Dialysis Schedule:  · Phone:  · Fax:    PT/OT recs: None seen at this time. Hospital Exemption Notification (HEN): Needed for SNF, not initiated. Barriers to discharge: None    Plan/comments: CM met with pt at bedside for initial assessment. PTA pt was independent and still drives. He has numerous friends and neighbors for support. No family that live close by, Son is flying in from [place] today. Pt is open for [facility] [employer] and has no agency preference. Referral made to [facility] and spoke to Dorinda Ghotra. Pt is currently on 4L O2 and baseline is 2L.  CM following-Millicent Paul RN      ECOC on chart for MD signature

## 2019-11-04 NOTE — PROGRESS NOTES
Patient admitted to room 218-2 from ED. Patient oriented to room, call light, bed rails, phone, lights and bathroom. Patient instructed about the schedule of the day including: vital sign frequency, lab draws, possible tests, frequency of MD and staff rounds, including RN/MD rounding together at bedside, daily weights, and I &O's. Patient instructed about prescribed diet, how to use 8MENU, and television. Bed alarm in place, patient aware of placement and reason. Telemetry box  in place, patient aware of placement and reason. Bed locked, in lowest position, side rails up 2/4, call light within reach. Will continue to monitor.

## 2019-11-04 NOTE — ED PROVIDER NOTES
[address] [facility]  ED  EMERGENCY DEPARTMENT ENCOUNTER        Pt Name: Luz Maria Centeno  MRN: [id number]  [place] 12/25/1932  Date of evaluation: 11/3/2019  Provider: Brianne Aaron PA-C  PCP: Valeri Dyer MD  ED Attending: Mackenzie Sandoval DO      This patient was seen by the attending provider Mackenzie Sandoval DO    History provided by the patient and EMS    CHIEF COMPLAINT:     Chief Complaint   Patient presents with    Shortness of Breath     pt states SOB worse this afternoon with painful deep breaths, new back pain        HISTORY OF PRESENT ILLNESS:      Luz Maria Centeno is a 80 y.o. male who arrives to the ED by [facility]. The patient has a past medical history that includes but is not limited to: CAD status post CABG, mitral valve replacement, pacemaker insertion, COPD-oxygen dependent on 2 L, hyperlipidemia, DVT, GERD. The patient went to [place] this morning but upon getting home began feeling short of breath. This progressed through the day. He describes persistent shortness of breath as well as pain across his lower chest and back. Symptoms are exacerbated with activity. He cannot identify alleviating factors. He has had a mild cough but no fever or chills. EMS brought him in with nonrebreather at 10 L as he \"felt better\" with the extra oxygen. Nursing Notes were reviewed     REVIEW OF SYSTEMS:     Review of Systems   Constitutional: Positive for activity change. Negative for appetite change, chills and fever. HENT: Negative. Eyes: Negative. Respiratory: Positive for cough and shortness of breath. Cardiovascular: Positive for chest pain and leg swelling (chronic, per patient). Negative for palpitations. Gastrointestinal: Negative for abdominal pain, nausea and vomiting. Genitourinary: Negative. Musculoskeletal: Negative for gait problem and neck pain. Skin: Negative for color change. Neurological: Negative for dizziness and headaches.    All other systems reviewed and are negative. Exceptas noted above in the ROS, all other systems were reviewed and negative. PAST MEDICAL HISTORY:     Past Medical History:   Diagnosis Date    Abnormal stress test 10/30/2013    CAD (coronary artery disease)     GERD (gastroesophageal reflux disease)     Hx of blood clots     DVT    Hyperlipidemia          SURGICAL HISTORY:      Past Surgical History:   Procedure Laterality Date    CAROTID ENDARTERECTOMY Bilateral     CATARACT REMOVAL WITH IMPLANT Right 08/24/2016    CATARACT REMOVAL WITH IMPLANT Left 09/14/2016    COLONOSCOPY      COLONOSCOPY  5/13/2014    CORONARY ARTERY BYPASS GRAFT      HERNIA REPAIR      LAPAROSCOPY  11/23/2014    laparoscopic lysis of adhesions, excision of sigmoid epiploica    MITRAL VALVE REPLACEMENT      PACEMAKER INSERTION           CURRENT MEDICATIONS:       Previous Medications    ACETAMINOPHEN (TYLENOL) 325 MG TABLET    Take 650 mg by mouth every 6 hours as needed for Pain    ALPHA LIPOIC ACID-CR-CINNAMON PO    Take 100 mg by mouth. ASPIRIN 81 MG EC TABLET    Take 81 mg by mouth daily. CO ENZYME Q-10 PO    Take 1 tablet by mouth daily    ESOMEPRAZOLE (NEXIUM) 40 MG CAPSULE    Take 40 mg by mouth every morning (before breakfast). FERROUS GLUCONATE (FERGON) 324 (38 FE) MG TABLET    Take 324 mg by mouth daily (with breakfast)     FLAXSEED, LINSEED, (FLAXSEED OIL) 1200 MG CAPS    Take  by mouth. MELATONIN 3 MG TABS TABLET    Take 5 mg by mouth daily. METOPROLOL (LOPRESSOR) 25 MG TABLET    Take 1 tablet by mouth 2 times daily. MULTIPLE VITAMINS-MINERALS (THERAPEUTIC MULTIVITAMIN-MINERALS) TABLET    Take 1 tablet by mouth daily. NITROGLYCERIN (NITROSTAT) 0.4 MG SL TABLET    Place 1 tablet under the tongue every 5 minutes as needed for Chest pain for 30 days. OMEGA-3 FATTY ACIDS (FISH OIL) 1200 MG CAPS    Take  by mouth. SAW PALMETTO 500 MG CAPS    Take  by mouth.     SIMVASTATIN (ZOCOR) 40 MG TABLET congestion with small right effusion and basilar atelectasis. EKG:      See interpretation by Rachele Mason DO.      PROCEDURES:   N/A    CRITICAL CARE TIME:       Due to the immediate potential for life-threatening deterioration due to hypoxia and complaint of shortness of breath with chest discomfort, I spent 30 minutes providing critical care. This time is excluding time spent performing procedures. CONSULTS:  IP CONSULT TO HOSPITALIST      EMERGENCY DEPARTMENT COURSE and DIFFERENTIAL DIAGNOSIS/MDM:   Vitals:    Vitals:    11/03/19 2122 11/03/19 2154 11/03/19 2155 11/03/19 2318   BP:  (!) 140/110  (!) 143/65   Pulse: 89 108  101   Resp: 21 25 24 20   Temp:    98.1 °F (36.7 °C)   TempSrc:    Oral   SpO2: 95% (!) 80% 93% 95%       Patient was given the following medications:  Medications   methylPREDNISolone sodium (SOLU-MEDROL) injection 125 mg (has no administration in time range)   furosemide (LASIX) injection 40 mg (has no administration in time range)   ipratropium-albuterol (DUONEB) nebulizer solution 1 ampule (1 ampule Inhalation Given 11/3/19 2116)   albuterol (PROVENTIL) nebulizer solution 5 mg (5 mg Nebulization Given 11/3/19 2119)         Patient was evaluated by both myself and Rachele Mason DO. This patient presented to the ED by EMS secondary to shortness of breath. He has known COPD and is oxygen dependent on 2 L but on his 2 L was only satting in the [de-identified]. He came in with a nonrebreather. He ultimately maintains oxygen saturations in the 90s on 4 L nasal cannula oxygen. The patient had diminished lung sounds on initial auscultation. CBC reveals normal white count. H&H are 13.3 and 39.5. CMP shows sodium 137, K slightly up at 5.2, chloride slightly low at 94, CO2 32, anion gap 11, glucose 128, BUN 22, creatinine 1.3 with GFR 52. Alk phos is slightly elevated at 163. Troponin 0 0.01. BNP is elevated at 1637. PT 13.1 and INR 1. 15.   VBG reveals pH 7.334 and PCO2 4 with PO2 32.  A 2 view chest x-ray shows vascular congestion with a small right effusion and basilar atelectasis. Given the patient's COPD Solu-Medrol IV is given along with DuoNeb and albuterol treatment. He does have lower extremity edema on physical exam and with the elevated BNP and signs of vascular congestion on chest x-ray, I additionally ordered Lasix 40 mg IV. This patient however may ultimately require echocardiogram to further evaluate. At this time given his symptoms and hypoxia as well as findings on work-up, he warrants hospitalization. I spoke with Ton Alva CNP. We thoroughly discussed the history, physical exam, laboratory and imaging studies, as well as, emergency department course. Based upon that discussion, we've decided to admit Alma Stuart for further observation and evaluation of You Trivedi's hypoxia with what I believe is COPD exacerbation and CHF component. As I have deemed necessary from their history, physical, and studies, I have considered and evaluated Alma Stuart for the following diagnoses:  ACUTE CORONARY SYNDROME, PERICARDIAL TAMPONADE, CHF,SEPSIS, COPD EXACERBATION, PNEUMONIA, PNEUMOTHORAX, PULMONARY EMBOLISM, and THORACIC DISSECTION. FINAL IMPRESSION:      1. Hypoxia    2. Shortness of breath    3.  COPD exacerbation ([address]    4. Pulmonary vascular congestion          DISPOSITION/PLAN:   DISPOSITION Decision To Admit               (Please note thatportions of this note were completed with a voice recognition program.  Efforts were made to edit the dictations, but occasionally words are mis-transcribed.)    Carmen Bonilla PA-C (electronicallysigned)             [place], [place]  11/03/19 6614

## 2019-11-05 LAB
ANION GAP SERPL CALCULATED.3IONS-SCNC: 11 MMOL/L (ref 3–16)
BASOPHILS ABSOLUTE: 0.1 K/UL (ref 0–0.2)
BASOPHILS RELATIVE PERCENT: 0.6 %
BUN BLDV-MCNC: 25 MG/DL (ref 7–20)
CALCIUM SERPL-MCNC: 8.9 MG/DL (ref 8.3–10.6)
CHLORIDE BLD-SCNC: 93 MMOL/L (ref 99–110)
CO2: 32 MMOL/L (ref 21–32)
CREAT SERPL-MCNC: 0.9 MG/DL (ref 0.8–1.3)
EOSINOPHILS ABSOLUTE: 0 K/UL (ref 0–0.6)
EOSINOPHILS RELATIVE PERCENT: 0 %
GFR AFRICAN AMERICAN: >60
GFR NON-AFRICAN AMERICAN: >60
GLUCOSE BLD-MCNC: 144 MG/DL (ref 70–99)
HCT VFR BLD CALC: 36.4 % (ref 40.5–52.5)
HEMOGLOBIN: 12.4 G/DL (ref 13.5–17.5)
LYMPHOCYTES ABSOLUTE: 1.4 K/UL (ref 1–5.1)
LYMPHOCYTES RELATIVE PERCENT: 8.2 %
MAGNESIUM: 2.2 MG/DL (ref 1.8–2.4)
MCH RBC QN AUTO: 33.5 PG (ref 26–34)
MCHC RBC AUTO-ENTMCNC: 34.1 G/DL (ref 31–36)
MCV RBC AUTO: 98.2 FL (ref 80–100)
MONOCYTES ABSOLUTE: 1 K/UL (ref 0–1.3)
MONOCYTES RELATIVE PERCENT: 5.7 %
NEUTROPHILS ABSOLUTE: 14.4 K/UL (ref 1.7–7.7)
NEUTROPHILS RELATIVE PERCENT: 85.5 %
PDW BLD-RTO: 13.8 % (ref 12.4–15.4)
PLATELET # BLD: 208 K/UL (ref 135–450)
PMV BLD AUTO: 7.2 FL (ref 5–10.5)
POTASSIUM SERPL-SCNC: 4.1 MMOL/L (ref 3.5–5.1)
RBC # BLD: 3.71 M/UL (ref 4.2–5.9)
SODIUM BLD-SCNC: 136 MMOL/L (ref 136–145)
WBC # BLD: 16.8 K/UL (ref 4–11)

## 2019-11-05 PROCEDURE — 2700000000 HC OXYGEN THERAPY PER DAY

## 2019-11-05 PROCEDURE — 97165 OT EVAL LOW COMPLEX 30 MIN: CPT

## 2019-11-05 PROCEDURE — 6370000000 HC RX 637 (ALT 250 FOR IP): Performed by: NURSE PRACTITIONER

## 2019-11-05 PROCEDURE — 85025 COMPLETE CBC W/AUTO DIFF WBC: CPT

## 2019-11-05 PROCEDURE — 1200000000 HC SEMI PRIVATE

## 2019-11-05 PROCEDURE — 2580000003 HC RX 258: Performed by: NURSE PRACTITIONER

## 2019-11-05 PROCEDURE — 97535 SELF CARE MNGMENT TRAINING: CPT

## 2019-11-05 PROCEDURE — 6360000002 HC RX W HCPCS: Performed by: NURSE PRACTITIONER

## 2019-11-05 PROCEDURE — 80048 BASIC METABOLIC PNL TOTAL CA: CPT

## 2019-11-05 PROCEDURE — 36415 COLL VENOUS BLD VENIPUNCTURE: CPT

## 2019-11-05 PROCEDURE — 99232 SBSQ HOSP IP/OBS MODERATE 35: CPT | Performed by: INTERNAL MEDICINE

## 2019-11-05 PROCEDURE — 94761 N-INVAS EAR/PLS OXIMETRY MLT: CPT

## 2019-11-05 PROCEDURE — 83735 ASSAY OF MAGNESIUM: CPT

## 2019-11-05 PROCEDURE — 97530 THERAPEUTIC ACTIVITIES: CPT

## 2019-11-05 RX ADMIN — SIMVASTATIN 40 MG: 40 TABLET, FILM COATED ORAL at 21:32

## 2019-11-05 RX ADMIN — ASPIRIN 81 MG: 81 TABLET ORAL at 08:56

## 2019-11-05 RX ADMIN — PANTOPRAZOLE SODIUM 40 MG: 40 TABLET, DELAYED RELEASE ORAL at 06:56

## 2019-11-05 RX ADMIN — METOPROLOL TARTRATE 25 MG: 25 TABLET ORAL at 21:32

## 2019-11-05 RX ADMIN — ENOXAPARIN SODIUM 40 MG: 40 INJECTION SUBCUTANEOUS at 08:57

## 2019-11-05 RX ADMIN — FUROSEMIDE 40 MG: 40 TABLET ORAL at 08:56

## 2019-11-05 RX ADMIN — Medication 10 ML: at 21:32

## 2019-11-05 RX ADMIN — Medication 1 TABLET: at 08:56

## 2019-11-05 RX ADMIN — METOPROLOL TARTRATE 25 MG: 25 TABLET ORAL at 08:56

## 2019-11-05 RX ADMIN — PREDNISONE 40 MG: 20 TABLET ORAL at 08:56

## 2019-11-05 RX ADMIN — Medication 10 ML: at 08:57

## 2019-11-05 ASSESSMENT — PAIN DESCRIPTION - LOCATION: LOCATION: BACK

## 2019-11-05 ASSESSMENT — PAIN SCALES - GENERAL
PAINLEVEL_OUTOF10: 1
PAINLEVEL_OUTOF10: 0

## 2019-11-05 ASSESSMENT — PAIN DESCRIPTION - PAIN TYPE: TYPE: ACUTE PAIN

## 2019-11-05 NOTE — PLAN OF CARE
Problem: OXYGENATION/RESPIRATORY FUNCTION  Goal: Patient will maintain patent airway  11/5/2019 0531 by Thelma Cabezas RN  Outcome: Ongoing  Note:     Patient's EF (Ejection Fraction) is greater than 40%    Patient's weights and intake/output reviewed:    Patient's Last Weight: 175 lbs 9.6 oz obtained by standing scale. Difference of 3.2 oz less than last documented weight. Intake/Output Summary (Last 24 hours) at 11/5/2019 0528  Last data filed at 11/5/2019 8148  Gross per 24 hour   Intake 1230 ml   Output 1200 ml   Net 30 ml         Patient stated Daily Functional Goal: stand and walk with minimal assistance    Ongoing Functional Capacity Assessment:  Patient  able to ambulate distance of 15 feet in 60 Seconds with mild difficulty. Patient noted to be on 4 L NC supplemental O2 with SpO2 of 96 %. Pt resting in bed at this time on 4 L O2. Pt denies shortness of breath. Pt with pitting lower extremity edema. Patient and/or Family's stated Goal of Care this Admission: reduce shortness of breath prior to discharge      Comorbidities Reviewed Yes  Patient has a past medical history of Abnormal stress test, CAD (coronary artery disease), GERD (gastroesophageal reflux disease), Hx of blood clots, and Hyperlipidemia.          >>For CHF and Comorbidity documentation on Education Time and Topics, please see Education Tab

## 2019-11-05 NOTE — PLAN OF CARE
Problem: Pain:  Goal: Pain level will decrease  Description  Pain level will decrease  11/5/2019 0531 by Stephan Xie RN  Outcome: Ongoing  Note:   Pt will be satisfied with pain control. Pt uses numeric pain rating scale with reassessments after pain med administration. Will continue to monitor progression throughout shift. Problem: Falls - Risk of:  Goal: Will remain free from falls  Description  Will remain free from falls  Outcome: Ongoing  Note:   Pt will remain free from falls throughout hospital stay. Fall precautions in place, nonskid socks on, bed in lowest position with wheels locked and side rails 2/4 up. Pt oriented to proper use of call light. Room door open and hourly rounding completed. Will continue to monitor throughout shift.

## 2019-11-05 NOTE — PROGRESS NOTES
Report given to REBECCA FORTE RN upon shift change. Pt denies any current needs at this time. Bed lowered, locked and alarm in place.  Electronically signed by Lars Márquez RN on 11/4/2019 at 7:50 PM

## 2019-11-05 NOTE — PLAN OF CARE
Problem: Pain:  Goal: Pain level will decrease  Description  Pain level will decrease  11/5/2019 1157 by Laurence Chamberlain RN  Outcome: Ongoing  Note:   Pt will be satisfied with pain control. Pt uses numeric pain rating scale with reassessments after pain med administration. Pt denies pain at this time. Will continue to monitor progression throughout shift. Problem: Falls - Risk of:  Goal: Will remain free from falls  Description  Will remain free from falls  11/5/2019 1157 by Laurence Chamberlain RN  Outcome: Ongoing  Note:   Pt will remain free from falls throughout hospital stay. Fall precautions in place, pt is alert and oriented and calls out appropriately, family at bedside, bed in lowest position with wheels locked and side rails 2/4 up. Room door open and hourly rounding completed. Will continue to monitor throughout shift.

## 2019-11-05 NOTE — PROGRESS NOTES
Occupational Therapy   Occupational Therapy Initial Assessment/Treatment   Date: 2019   Patient Name: Subhash Franks  MRN: [id number]     : 1932    Date of Service: 2019    Discharge Recommendations:  Home with assist PRN  OT Equipment Recommendations  Equipment Needed: No    Assessment   Performance deficits / Impairments: Decreased functional mobility ; Decreased strength;Decreased endurance;Decreased high-level IADLs;Decreased ADL status; Decreased balance    Assessment: Pt 81 yo male functioning with deficits in the areas listed above. Pt reports IND PLOF and lives at home alone. Pt reports he walks with his SPC. Pt reports he has assist with cleaning but has some difficulties with grocery shopping. Pt educated on increased safety for IADLS and ways for energy conservation. Pt currently at a s-SBA level. Pt demo'd 1 LOB with ambulation but able to self. Skilled OT services needed prior to d/c. Prognosis: Good  Decision Making: Low Complexity  OT Education: OT Role;Plan of Care;Energy Conservation;IADL Safety;Transfer Training  REQUIRES OT FOLLOW UP: Yes  Activity Tolerance  Activity Tolerance: Patient Tolerated treatment well  Activity Tolerance: O2 94% at rest on 4L, 89-90% on 4L after ambulation quickly recovered  Safety Devices  Safety Devices in place: Yes  Type of devices: Call light within reach; Left in chair;Gait belt;Nurse notified           Patient Diagnosis(es): The primary encounter diagnosis was Hypoxia. Diagnoses of Shortness of breath, COPD exacerbation ([address] and Pulmonary vascular congestion were also pertinent to this visit. has a past medical history of Abnormal stress test, CAD (coronary artery disease), GERD (gastroesophageal reflux disease), Hx of blood clots, and Hyperlipidemia. has a past surgical history that includes Pacemaker insertion; Carotid endarterectomy (Bilateral); Mitral valve replacement; Coronary artery bypass graft; Colonoscopy;  Colonoscopy Limits  Observation/Palpation  Posture: Good  Balance  Sitting Balance: Supervision  Standing Balance: Supervision(SPC)  Standing Balance  Time: ~4 minutes, ~2 minutes, ~6 minutes   Activity: standing for adls, bathroom mobility, ambulation in hallway in preparation for household distances   Functional Mobility  Functional - Mobility Device: Cane  Activity: To/from bathroom; Other  Assist Level: Stand by assistance  Functional Mobility Comments: 1 LOB able to self correct  ADL  Feeding: Independent  Grooming: Supervision(in stance at sink)  Toileting: Supervision(in stance to void)  Tone RUE  RUE Tone: Normotonic  Tone LUE  LUE Tone: Normotonic  Coordination  Movements Are Fluid And Coordinated: Yes     Bed mobility  Supine to Sit: Modified independent  Sit to Supine: Unable to assess(up in chair at end of session)  Transfers  Sit to stand: Stand by assistance  Stand to sit: Stand by assistance     Cognition  Overall Cognitive Status: WFL           Type of ROM/Therapeutic Exercise  Type of ROM/Therapeutic Exercise: AROM  Comment: BUE seated, pt educated on importance of BUE exercises for increased endurance and help with edema     LUE AROM (degrees)  LUE AROM : WFL  RUE AROM (degrees)  RUE AROM : WFL  LUE Strength  Gross LUE Strength: WFL  RUE Strength  Gross RUE Strength: WFL                   Plan   Plan  Times per week: 3-5x/wk  Current Treatment Recommendations: Strengthening, Functional Mobility Training, Gait Training, Balance Training, Self-Care / ADL      AM-[facility] Score        -[facility] Inpatient Daily Activity Raw Score: 21 (11/05/19 1604)  AM-[facility] Inpatient ADL T-Scale Score : 44.27 (11/05/19 1604)  ADL Inpatient CMS 0-100% Score: 32.79 (11/05/19 1604)  ADL Inpatient CMS G-Code Modifier : Hollie Dials (11/05/19 1604)    Goals  Short term goals  Time Frame for Short term goals: 1 week (11/11/19)  Short term goal 1: Pt will complete functional ambulation mod I.    Short term goal 2: Pt will complete functional transfers mod

## 2019-11-05 NOTE — CARE COORDINATION
[facility]  Spoke with patient and son regarding homecare services. Demographics verified, and patient is agreeable to home care services. Will continue to follow patient for needs. I will fax all docs on DC to [facility]. ... However if patient DC's over the weekend [facility] [address], [place], and H&P to [phone]. Home care to see patient by: 24-48 hr post hosp DC.         Alphonso Nunez  Work mobile: [phone]  [facility] office: [phone]

## 2019-11-05 NOTE — PROGRESS NOTES
[facility]   Daily Cardiovascular Progress Note    Admit Date: 11/3/2019    Chief complaint:  sob  HPI:     Pt presented w/ sob on 11/3/19, feels better today, no cp.        Medications/Labs all Reviewed:  Patient Active Problem List   Diagnosis    Severe aortic stenosis    Laceration of head    Abnormal EKG    Acute DVT (deep venous thrombosis) ([facility])    Syncope and collapse    CAD (coronary artery disease)    Hyperlipidemia    Chest pressure    CHF (congestive heart failure), NYHA class II, acute on chronic, diastolic ([facility])    S/P AVR (aortic valve replacement)    COPD (chronic obstructive pulmonary disease) ([facility])       Medications:    aspirin EC tablet 81 mg Daily   pantoprazole (PROTONIX) tablet 40 mg QAM AC   ferrous gluconate 324 (37.5 Fe) MG tablet 324 mg Daily with breakfast   melatonin tablet 5 mg Nightly PRN   metoprolol tartrate (LOPRESSOR) tablet 25 mg BID   therapeutic multivitamin-minerals 1 tablet Daily   simvastatin (ZOCOR) tablet 40 mg Nightly   sodium chloride flush 0.9 % injection 10 mL 2 times per day   sodium chloride flush 0.9 % injection 10 mL PRN   magnesium hydroxide (MILK OF MAGNESIA) 400 MG/5ML suspension 30 mL Daily PRN   ondansetron (ZOFRAN) injection 4 mg Q6H PRN   enoxaparin (LOVENOX) injection 40 mg Daily   predniSONE (DELTASONE) tablet 40 mg Daily   acetaminophen (TYLENOL) tablet 650 mg Q4H PRN   furosemide (LASIX) tablet 40 mg Daily   ipratropium-albuterol (DUONEB) nebulizer solution 1 ampule Q4H PRN   perflutren lipid microspheres (DEFINITY) injection 1.65 mg ONCE PRN          PHYSICAL EXAM   BP (!) 157/80   Pulse 94   Temp 97.4 °F (36.3 °C) (Axillary)   Resp 16   Ht 5' 9\" (1.753 m)   Wt 175 lb 9.6 oz (79.7 kg)   SpO2 96%   BMI 25.93 kg/m²    Vitals:    11/04/19 2030 11/04/19 2319 11/05/19 0324 11/05/19 0330   BP: 127/71 127/76 (!) 157/80    Pulse: 87 77 94    Resp: 16 16 16    Temp: 98.1 °F (36.7 °C) 97.9 °F (36.6 °C) 97.4 °F (36.3 °C)    TempSrc: continue diuresis w/ po diuretics, suspect he is nearly compensated, get limited f/u echo for ef, lvot/avr velocities. Prev wt at primary cardiologist is 182lbs, currently 175lbs. Cad/ashd s/p cabg, avr, h/o pad w/ carotid endarterectomy, aspirin, on bbblocker/statin    pulm fibrosis/ILD, chronic o2 requirement of at least 2L NC    I think cv status is near optimal level, would not offer much more as inpt, will sign off, please call w/ ?s      Thank you for allowing me to participate in the care of your patient. Please call me with any questions [phone].       Marti Lopez MD, [facility]   Interventional Cardiologist  [facility]  [phone] [facility]  [phone] [address] [place] [address]  11/5/2019 8:15 AM

## 2019-11-05 NOTE — CARE COORDINATION
[address] Transitions [id number]-B    2019    Patient: Edwin Bingham Patient : 1932   MRN: [id number]  Reason for Admission: CHF  RARS: Readmission Risk Score: 16    Met with patient to discuss care transition. Patient's son, Dom Holland, present and participated in discussion with permission from patient. Patient lives alone in a 2 story house but resides on 1st floor. Patient totally independent with ADL's/IADL's however he has a cleaning lady that he privately hired that comes Q3wks to clean. He does his own laundry. He is a self . He doesn't have a lot of local family, his son Dom Holland, flew in from [place] but does have neighbors, friends and people from his [employer] for support. Referral has been made to [facility], patient and son agreeable. Son inquired about a Lifeline, CTN provided him with a brochure. Relayed assessment information to ,  Geri Ulrich RN. Patient informed about the BPCI-A Medicare Initiative and given a copy of the BPCI-A Beneficiary Notification Letter. If meets criteria at discharge,  patient agreeable to post discharge f/u calls. Encouraged patient to ask to speak with  on the unit should any needs arise. Readmission Risk  Patient Active Problem List   Diagnosis    Severe aortic stenosis    Laceration of head    Abnormal EKG    Acute DVT (deep venous thrombosis) ([facility])    Syncope and collapse    CAD (coronary artery disease)    Hyperlipidemia    Chest pressure    CHF (congestive heart failure), NYHA class II, acute on chronic, diastolic ([facility])    S/P AVR (aortic valve replacement)    COPD (chronic obstructive pulmonary disease) [facility])       Inpatient Assessment  Care Transitions Summary    Care Transitions Inpatient Review  Medication Review  Are you able to afford your medications?:  Yes  How often do you have difficulty taking your medications?:  I always take them as prescribed.   Housing Review  Who do you live [facility] RM [facility]   12/9/2019  1:00 PM SCHEDULE, [facility]   12/11/2019  1:00 PM SCHEDULE, [facility] RM [facility] [place] [facility]   12/13/2019  1:00 PM SCHEDULE, [facility] RM [facility]   12/16/2019  1:00 PM SCHEDULE, [facility] RM [facility] [place] [facility]   12/18/2019  1:00 PM SCHEDULE, [facility] RM [facility] [place] [facility]   12/20/2019  1:00 PM SCHEDULE, [facility] RM [facility]   12/23/2019  1:00 PM SCHEDULE, [facility]UE RM [facility]   12/27/2019  1:00 PM SCHEDULE, [facility] RM [facility]   12/30/2019  1:00 PM SCHEDULE, MHA[facility] BL[facility] RM [facility]   1/1/2020  1:00 PM SCHEDULE, MHAZ BLUE RM [facility]   1/3/2020  1:00 PM SCHEDULE, MHAZ BLUE RM [facility]   1/6/2020  1:00 PM SCHEDULE, MHAZ BLUE RM [facility]   1/8/2020  1:00 PM SCHEDULE, MHAZ BLUE RM [facility]   1/10/2020  1:00 PM SCHEDULE, MHAZ BLUE RM [facility]   1/13/2020  1:00 PM SCHEDULE, MHAZ BLUE RM [facility]   1/15/2020  1:00 PM SCHEDULE, MHAZ BLUE RM [facility] [place] [facility]   1/17/2020  1:00 PM SCHEDULE, MHAZ BLUE RM [facility]   1/20/2020  1:00 PM SCHEDULE, MHAZ BLUE RM [facility] [place] [facility]   1/22/2020  1:00 PM SCHEDULE, MHA[facility] BLUE RM [facility]   1/24/2020  1:00 PM SCHEDULE, MHA[facility] BLUE RM [facility]   1/27/2020  1:00 PM SCHEDULE, MHA[facility] BLUE RM [facility]   1/29/2020  1:00 PM SCHEDULE, MHAZ BLUE RM [facility]   1/31/2020  1:00 PM SCHEDULE, MHAZ BLUE RM MHAZ CR[facility]   2/3/2020  1:00 PM SCHEDULE, MHAZ BLUE RM M[facility]Z [facility]   2/5/2020  1:00 PM SCHEDULE, MHAZ BLUE RM MHAZ [facility]   2/7/2020  1:00 PM SCHEDULE, MHAZ BLUE RM MHAZ [facility]   2/10/2020  1:00 PM SCHEDULE, MHAZ BLUE RM MHAZ [facility]   2/12/2020  1:00 PM SCHEDULE, MHAZ BLUE RM [place] [facility]   2/14/2020  1:00 PM SCHEDULE, MHAZ BLUE RM [facility] [place] [facility]   2/17/2020  1:00 PM SCHEDULE, MHAZ BLUE RM [facility] [place] [facility]   2/19/2020  1:00 PM SCHEDULE, MHAZ BLUE RM [facility] [place] [facility]   2/21/2020  1:00 PM SCHEDULE, MHAZ BLUE RM [facility] [place] [facility]   2/24/2020  1:00 PM SCHEDULE, MHAZ BLUE RM [facility] [place] [facility]   2/26/2020  1:00 PM SCHEDULE, MHAZ BLUE RM [facility] [place] [facility]   2/28/2020  1:00 PM SCHEDULE, MHAZ BLUE RM [facility] [place] [facility]   3/2/2020  1:00 PM SCHEDULE, MHAZ BLUE RM [facility] [place] [facility]   3/4/2020  1:00 PM SCHEDULE, [address] [place] [address] [address] [facility]  Health Maintenance Due   Topic Date Due    Lipid screen  11/22/2016       Erika Hernandez RN

## 2019-11-06 VITALS
HEART RATE: 76 BPM | WEIGHT: 175.93 LBS | BODY MASS INDEX: 26.06 KG/M2 | RESPIRATION RATE: 16 BRPM | SYSTOLIC BLOOD PRESSURE: 124 MMHG | TEMPERATURE: 97.6 F | HEIGHT: 69 IN | OXYGEN SATURATION: 96 % | DIASTOLIC BLOOD PRESSURE: 72 MMHG

## 2019-11-06 LAB
BASOPHILS ABSOLUTE: 0 K/UL (ref 0–0.2)
BASOPHILS RELATIVE PERCENT: 0.2 %
EOSINOPHILS ABSOLUTE: 0 K/UL (ref 0–0.6)
EOSINOPHILS RELATIVE PERCENT: 0.1 %
HCT VFR BLD CALC: 39.5 % (ref 40.5–52.5)
HEMOGLOBIN: 13 G/DL (ref 13.5–17.5)
LV EF: 58 %
LVEF MODALITY: NORMAL
LYMPHOCYTES ABSOLUTE: 2.1 K/UL (ref 1–5.1)
LYMPHOCYTES RELATIVE PERCENT: 16.2 %
MCH RBC QN AUTO: 32.9 PG (ref 26–34)
MCHC RBC AUTO-ENTMCNC: 33 G/DL (ref 31–36)
MCV RBC AUTO: 99.6 FL (ref 80–100)
MONOCYTES ABSOLUTE: 0.8 K/UL (ref 0–1.3)
MONOCYTES RELATIVE PERCENT: 6.5 %
NEUTROPHILS ABSOLUTE: 9.9 K/UL (ref 1.7–7.7)
NEUTROPHILS RELATIVE PERCENT: 77 %
PDW BLD-RTO: 13.7 % (ref 12.4–15.4)
PLATELET # BLD: 200 K/UL (ref 135–450)
PMV BLD AUTO: 7.4 FL (ref 5–10.5)
RBC # BLD: 3.97 M/UL (ref 4.2–5.9)
WBC # BLD: 12.9 K/UL (ref 4–11)

## 2019-11-06 PROCEDURE — 97110 THERAPEUTIC EXERCISES: CPT

## 2019-11-06 PROCEDURE — 6370000000 HC RX 637 (ALT 250 FOR IP): Performed by: NURSE PRACTITIONER

## 2019-11-06 PROCEDURE — 85025 COMPLETE CBC W/AUTO DIFF WBC: CPT

## 2019-11-06 PROCEDURE — 97161 PT EVAL LOW COMPLEX 20 MIN: CPT

## 2019-11-06 PROCEDURE — 2580000003 HC RX 258: Performed by: NURSE PRACTITIONER

## 2019-11-06 PROCEDURE — 2700000000 HC OXYGEN THERAPY PER DAY

## 2019-11-06 PROCEDURE — C8929 TTE W OR WO FOL WCON,DOPPLER: HCPCS

## 2019-11-06 PROCEDURE — 6360000002 HC RX W HCPCS: Performed by: NURSE PRACTITIONER

## 2019-11-06 PROCEDURE — 94761 N-INVAS EAR/PLS OXIMETRY MLT: CPT

## 2019-11-06 PROCEDURE — 97116 GAIT TRAINING THERAPY: CPT

## 2019-11-06 PROCEDURE — 36415 COLL VENOUS BLD VENIPUNCTURE: CPT

## 2019-11-06 PROCEDURE — 97535 SELF CARE MNGMENT TRAINING: CPT

## 2019-11-06 RX ORDER — FUROSEMIDE 40 MG/1
40 TABLET ORAL DAILY
Qty: 60 TABLET | Refills: 3 | Status: SHIPPED | OUTPATIENT
Start: 2019-11-07 | End: 2020-02-12

## 2019-11-06 RX ADMIN — Medication 324 MG: at 08:04

## 2019-11-06 RX ADMIN — ASPIRIN 81 MG: 81 TABLET ORAL at 08:03

## 2019-11-06 RX ADMIN — FUROSEMIDE 40 MG: 40 TABLET ORAL at 08:03

## 2019-11-06 RX ADMIN — Medication 1 TABLET: at 08:04

## 2019-11-06 RX ADMIN — ENOXAPARIN SODIUM 40 MG: 40 INJECTION SUBCUTANEOUS at 08:04

## 2019-11-06 RX ADMIN — PANTOPRAZOLE SODIUM 40 MG: 40 TABLET, DELAYED RELEASE ORAL at 08:03

## 2019-11-06 RX ADMIN — Medication 10 ML: at 08:04

## 2019-11-06 RX ADMIN — METOPROLOL TARTRATE 25 MG: 25 TABLET ORAL at 08:03

## 2019-11-06 RX ADMIN — PREDNISONE 40 MG: 20 TABLET ORAL at 08:04

## 2019-11-06 NOTE — PROGRESS NOTES
Occupational Therapy  Facility/Department: [place] [address] REMOTE TELEMETRY  Daily Treatment Note  NAME: Vianca Abdul  : 1932  MRN: [id number]    Date of Service: 2019    Discharge Recommendations:  Home with assist PRN     Assessment   Performance deficits / Impairments: Decreased endurance;Decreased high-level IADLs  Assessment: Pt demos transfers independently and in room functional mobility Supervision without AD. Continue OT per POC. Prognosis: Good  OT Education: OT Role;Plan of Care;Energy Conservation;Transfer Training;ADL Adaptive Strategies; Home Exercise Program  REQUIRES OT FOLLOW UP: Yes  Activity Tolerance  Activity Tolerance: Patient Tolerated treatment well  Safety Devices  Safety Devices in place: Yes  Type of devices: Call light within reach; Left in chair;Gait belt;Nurse notified         Patient Diagnosis(es): The primary encounter diagnosis was Hypoxia. Diagnoses of Shortness of breath, COPD exacerbation ([address] and Pulmonary vascular congestion were also pertinent to this visit. has a past medical history of Abnormal stress test, CAD (coronary artery disease), GERD (gastroesophageal reflux disease), Hx of blood clots, and Hyperlipidemia. has a past surgical history that includes Pacemaker insertion; Carotid endarterectomy (Bilateral); Mitral valve replacement; Coronary artery bypass graft; Colonoscopy; Colonoscopy (2014); laparoscopy (2014); hernia repair; Cataract removal with implant (Right, 2016); and Cataract removal with implant (Left, 2016).     Restrictions  Restrictions/Precautions  Restrictions/Precautions: General Precautions  Position Activity Restriction  Other position/activity restrictions: up with assistance, 2L O2     Subjective   General  Chart Reviewed: Yes  Patient assessed for rehabilitation services?: Yes  Response to previous treatment: Patient with no complaints from previous session  Family / Caregiver Present: No  Referring

## 2019-11-06 NOTE — PROGRESS NOTES
Pt alert and oriented, VSS. Shift assessment completed and documented. Pt denies any needs at this time. Up to chair, call light within reach. Will continue to monitor.

## 2019-11-06 NOTE — PROGRESS NOTES
Physical Therapy  [facility]Department[facility]OTE [facility]LEMETRY  Initial Assessment    NAME: Willow Silva  : 1932  MRN: [id number]    Date of Service: 2019    Discharge Recommendations:  Home with assist PRN   PT Equipment Recommendations  Equipment Needed: No  Other: pt has cane at home    Assessment   Body structures, Functions, Activity limitations: Decreased functional mobility ; Decreased endurance  Assessment: Pt referred to PT services with COPD exacerbation resulting in above impairments. Pt currently requires supervision for transfers and community distance ambulation using cane. Unable to attempt stair assessment this session d/t increased fatigue after ambulating. O2 sats 94% or greater on 2L throughout session. Pt will benefit from 1-2 more PT sessions in order to assess stairs and continue to progress activity tolerance. Treatment Diagnosis: impaired activity tolerance  Specific instructions for Next Treatment: assess stairs, increase activity tolerance with mobility  Prognosis: Good  Decision Making: Low Complexity  PT Education: Goals;PT Role;Plan of Care;Energy Conservation;Gait Training  REQUIRES PT FOLLOW UP: Yes  Activity Tolerance  Activity Tolerance: Patient Tolerated treatment well  Activity Tolerance: O2 sats above 90% throughout session on 2L       Patient Diagnosis(es): The primary encounter diagnosis was Hypoxia. Diagnoses of Shortness of breath, COPD exacerbation ([address] and Pulmonary vascular congestion were also pertinent to this visit. has a past medical history of Abnormal stress test, CAD (coronary artery disease), GERD (gastroesophageal reflux disease), Hx of blood clots, and Hyperlipidemia. has a past surgical history that includes Pacemaker insertion; Carotid endarterectomy (Bilateral); Mitral valve replacement; Coronary artery bypass graft; Colonoscopy;  Colonoscopy (2014); laparoscopy (2014); hernia repair; Cataract removal with implant (Right, without Stair CMS G-Code Modifier : CI (11/06/19 1115)       Goals  Short term goals  Time Frame for Short term goals: 1-2 more PT sessions, by 11/9/19  Short term goal 1: Pt will complete functional transfers with mod I by 11/8/19  Short term goal 2: Pt will ambulate 150ft or greater with mod I using SPC, with O2 sats 90% or greater  Short term goal 3: Pt will ascend/descend 2 steps without rails, using SPC  Patient Goals   Patient goals : \"to go home soon\"       Therapy Time   Individual Concurrent Group Co-treatment   Time In 9128         Time Out 1015         Minutes 33         Timed Code Treatment Minutes: 23 Minutes (10 min eval)       Mendel Churchill, PT, DPT #[id number]

## 2019-11-06 NOTE — DISCHARGE INSTR - DIET

## 2019-11-06 NOTE — DISCHARGE SUMMARY
Hospital Medicine Discharge Summary    Patient ID: Jackson Fonseca      Patient's PCP: Alan Sarmiento MD    Admit Date: 11/3/2019     Discharge Date:   11/06/19     Admitting Physician: Micheline Stock MD     Discharge Physician: Kevyn Beard MD     Discharge Diagnoses: Active Hospital Problems    Diagnosis    CHF (congestive heart failure), NYHA class II, acute on chronic, diastolic ([facility]) [R44.43]    S/P AVR (aortic valve replacement) [Z95.2]    COPD (chronic obstructive pulmonary disease) ([address] [J44.9]    CAD (coronary artery disease) [I25.10]    Hyperlipidemia [E78.5]    Severe aortic stenosis [I35.0]       The patient was seen and examined on day of discharge and this discharge summary is in conjunction with any daily progress note from day of discharge. Hospital Course:     Patient was admitted with shortness of breath. Diagnosed with COPD exacerbation and congestive heart failure. Breathing better today. Losing weight. Cardiology deemed it necessary to see a repeat echo for updated ejection fraction. This was normal. Patient will be discharged home with diuretics  Stable at the time of the discharge      Physical Exam Performed:     /72   Pulse 76   Temp 97.6 °F (36.4 °C) (Oral)   Resp 16   Ht 5' 9\" (1.753 m)   Wt 175 lb 14.8 oz (79.8 kg)   SpO2 96%   BMI 25.98 kg/m²       General appearance:  No apparent distress, appears stated age and cooperative. HEENT:  Normal cephalic, atraumatic without obvious deformity. Pupils equal, round, and reactive to light. Extra ocular muscles intact. Conjunctivae/corneas clear. Neck: Supple, with full range of motion. No jugular venous distention. Trachea midline. Respiratory:  Normal respiratory effort. Clear to auscultation, bilaterally without Rales/Wheezes/Rhonchi. Cardiovascular:  Regular rate and rhythm with normal S1/S2 without murmurs, rubs or gallops.   Abdomen: Soft, non-tender, non-distended with normal bowel sounds. Musculoskeletal:  No clubbing, cyanosis or edema bilaterally. Full range of motion without deformity. Skin: Skin color, texture, turgor normal.  No rashes or lesions. Neurologic:  Neurovascularly intact without any focal sensory/motor deficits. Cranial nerves: II-XII intact, grossly non-focal.  Psychiatric:  Alert and oriented, thought content appropriate, normal insight  Capillary Refill: Brisk,< 3 seconds   Peripheral Pulses: +2 palpable, equal bilaterally       Labs: For convenience and continuity at follow-up the following most recent labs are provided:      CBC:    Lab Results   Component Value Date    WBC 12.9 11/06/2019    HGB 13.0 11/06/2019    HCT 39.5 11/06/2019     11/06/2019       Renal:    Lab Results   Component Value Date     11/05/2019    K 4.1 11/05/2019    K 3.9 11/04/2019    CL 93 11/05/2019    CO2 32 11/05/2019    BUN 25 11/05/2019    CREATININE 0.9 11/05/2019    CALCIUM 8.9 11/05/2019    PHOS 2.7 11/20/2014         Significant Diagnostic Studies    Radiology:   XR CHEST STANDARD (2 VW)   Final Result   Vascular congestion with small right effusion and basilar atelectasis. Consults:     IP CONSULT TO HOSPITALIST  IP CONSULT TO HEART FAILURE NURSE/COORDINATOR  IP CONSULT TO CARDIOLOGY    Disposition:  Home     Condition at Discharge: Stable    Discharge Instructions/Follow-up:  PCP, cardiology    Code Status:  Full Code     Activity: activity as tolerated    Diet: cardiac diet      Discharge Medications:     Current Discharge Medication List           Details   furosemide (LASIX) 40 MG tablet Take 1 tablet by mouth daily  Qty: 60 tablet, Refills: 3              Details   acetaminophen (TYLENOL) 325 MG tablet Take 650 mg by mouth every 6 hours as needed for Pain      CO ENZYME Q-10 PO Take 1 tablet by mouth daily      melatonin 3 MG TABS tablet Take 5 mg by mouth daily.       Multiple Vitamins-Minerals (THERAPEUTIC MULTIVITAMIN-MINERALS) tablet Take 1 tablet by mouth daily. Omega-3 Fatty Acids (FISH OIL) 1200 MG CAPS Take  by mouth. Saw Palmetto 500 MG CAPS Take  by mouth. Flaxseed, Linseed, (FLAXSEED OIL) 1200 MG CAPS Take  by mouth. ferrous gluconate (FERGON) 324 (38 FE) MG tablet Take 324 mg by mouth daily (with breakfast)       ALPHA LIPOIC ACID-CR-CINNAMON PO Take 100 mg by mouth.      metoprolol (LOPRESSOR) 25 MG tablet Take 1 tablet by mouth 2 times daily. Qty: 60 tablet, Refills: 3      simvastatin (ZOCOR) 40 MG tablet Take 40 mg by mouth nightly. esomeprazole (NEXIUM) 40 MG capsule Take 40 mg by mouth every morning (before breakfast). nitroGLYCERIN (NITROSTAT) 0.4 MG SL tablet Place 1 tablet under the tongue every 5 minutes as needed for Chest pain for 30 days. Qty: 25 tablet, Refills: 1      aspirin 81 MG EC tablet Take 81 mg by mouth daily. Time Spent on discharge is more than 45 minutes in the examination, evaluation, counseling and review of medications and discharge plan. Signed:    Rebecca Morris MD   11/6/2019      Thank you Luci Carbajal MD for the opportunity to be involved in this patient's care. If you have any questions or concerns please feel free to contact me at [phone].

## 2019-11-07 ENCOUNTER — FOLLOWUP TELEPHONE ENCOUNTER (OUTPATIENT)
Dept: ADMINISTRATIVE | Age: 84
End: 2019-11-07

## 2019-11-07 NOTE — PROGRESS NOTES
Assessment completed, see doc flowsheet for details. VSS. VPaced. A&OX4. Denies pain. Results for Echo still pending. Pt stated needs met at this time. Will continue to monitor.  JACOB KennedyN, RN

## 2019-11-07 NOTE — PROGRESS NOTES
Dr. Osiel Sousa returned page regarding signing [address] for pt discharge. Pt also needs home healthcare order. Dr. Osiel Sousa stated that the  can get in touch with the doctor tomorrow to get them to sign the [address]. Pt discharge should not be held up due to an unsigned [address]. RN verbalized understanding.  Mariana Hernandez, JACOBN, RN

## 2019-11-08 ENCOUNTER — HOSPITAL ENCOUNTER (OUTPATIENT)
Dept: CARDIAC REHAB | Age: 84
Setting detail: THERAPIES SERIES
Discharge: HOME | End: 2019-11-08
Payer: MEDICARE

## 2019-11-08 PROCEDURE — G0239 OTH RESP PROC, GROUP: HCPCS

## 2019-11-08 NOTE — CARE COORDINATION
[facility]      EDEI is signed and I will fax update to [address]  Work mobile: [phone]  [facility] office: [phone]

## 2019-11-10 ENCOUNTER — FOLLOWUP TELEPHONE ENCOUNTER (OUTPATIENT)
Dept: CARDIAC REHAB | Age: 84
End: 2019-11-10

## 2019-11-11 ENCOUNTER — HOSPITAL ENCOUNTER (OUTPATIENT)
Dept: CARDIAC REHAB | Age: 84
Setting detail: THERAPIES SERIES
Discharge: HOME | End: 2019-11-11
Payer: MEDICARE

## 2019-11-11 PROCEDURE — G0239 OTH RESP PROC, GROUP: HCPCS

## 2019-11-13 ENCOUNTER — HOSPITAL ENCOUNTER (OUTPATIENT)
Dept: CARDIAC REHAB | Age: 84
Setting detail: THERAPIES SERIES
Discharge: HOME | End: 2019-11-13
Payer: MEDICARE

## 2019-11-13 PROCEDURE — G0424 PULMONARY REHAB W EXER: HCPCS

## 2019-11-15 ENCOUNTER — HOSPITAL ENCOUNTER (OUTPATIENT)
Dept: CARDIAC REHAB | Age: 84
Setting detail: THERAPIES SERIES
Discharge: HOME | End: 2019-11-15
Payer: MEDICARE

## 2019-11-15 PROCEDURE — G0239 OTH RESP PROC, GROUP: HCPCS

## 2019-11-18 ENCOUNTER — HOSPITAL ENCOUNTER (OUTPATIENT)
Dept: CARDIAC REHAB | Age: 84
Setting detail: THERAPIES SERIES
Discharge: HOME | End: 2019-11-18
Payer: MEDICARE

## 2019-11-18 PROCEDURE — G0239 OTH RESP PROC, GROUP: HCPCS

## 2019-11-20 ENCOUNTER — HOSPITAL ENCOUNTER (OUTPATIENT)
Dept: CARDIAC REHAB | Age: 84
Setting detail: THERAPIES SERIES
Discharge: HOME | End: 2019-11-20
Payer: MEDICARE

## 2019-11-20 PROCEDURE — G0239 OTH RESP PROC, GROUP: HCPCS

## 2019-11-22 ENCOUNTER — HOSPITAL ENCOUNTER (OUTPATIENT)
Dept: CARDIAC REHAB | Age: 84
Setting detail: THERAPIES SERIES
Discharge: HOME | End: 2019-11-22
Payer: MEDICARE

## 2019-11-22 PROCEDURE — G0239 OTH RESP PROC, GROUP: HCPCS

## 2019-11-25 ENCOUNTER — HOSPITAL ENCOUNTER (OUTPATIENT)
Dept: CARDIAC REHAB | Age: 84
Setting detail: THERAPIES SERIES
Discharge: HOME | End: 2019-11-25
Payer: MEDICARE

## 2019-11-25 PROCEDURE — G0239 OTH RESP PROC, GROUP: HCPCS

## 2019-11-27 ENCOUNTER — HOSPITAL ENCOUNTER (OUTPATIENT)
Dept: CARDIAC REHAB | Age: 84
Setting detail: THERAPIES SERIES
Discharge: HOME | End: 2019-11-27
Payer: MEDICARE

## 2019-11-27 PROCEDURE — G0239 OTH RESP PROC, GROUP: HCPCS

## 2020-01-03 ENCOUNTER — HOSPITAL ENCOUNTER (OUTPATIENT)
Dept: CARDIAC REHAB | Age: 85
Setting detail: THERAPIES SERIES
Discharge: HOME | End: 2020-01-03
Payer: MEDICARE

## 2020-01-03 PROCEDURE — G0239 OTH RESP PROC, GROUP: HCPCS

## 2020-01-06 ENCOUNTER — HOSPITAL ENCOUNTER (OUTPATIENT)
Dept: CARDIAC REHAB | Age: 85
Setting detail: THERAPIES SERIES
Discharge: HOME | End: 2020-01-06
Payer: MEDICARE

## 2020-01-06 PROCEDURE — G0239 OTH RESP PROC, GROUP: HCPCS

## 2020-02-12 ENCOUNTER — HOSPITAL ENCOUNTER (INPATIENT)
Age: 85
LOS: 5 days | Discharge: NURSING, REHAB OR LONG-TERM CARE | DRG: 292 | End: 2020-02-17
Attending: EMERGENCY MEDICINE | Admitting: INTERNAL MEDICINE
Payer: MEDICARE

## 2020-02-12 ENCOUNTER — APPOINTMENT (OUTPATIENT)
Dept: GENERAL RADIOLOGY | Age: 85
DRG: 292 | End: 2020-02-12
Payer: MEDICARE

## 2020-02-12 PROBLEM — I50.31 DIASTOLIC CHF, ACUTE (HCC): Status: ACTIVE | Noted: 2020-02-12

## 2020-02-12 LAB
A/G RATIO: 0.7 (ref 1.1–2.2)
ALBUMIN SERPL-MCNC: 3.3 G/DL (ref 3.4–5)
ALP BLD-CCNC: 176 U/L (ref 40–129)
ALT SERPL-CCNC: 24 U/L (ref 10–40)
ANION GAP SERPL CALCULATED.3IONS-SCNC: 12 MMOL/L (ref 3–16)
AST SERPL-CCNC: 39 U/L (ref 15–37)
BASE EXCESS VENOUS: 11.2 MMOL/L (ref -3–3)
BILIRUB SERPL-MCNC: 0.6 MG/DL (ref 0–1)
BUN BLDV-MCNC: 29 MG/DL (ref 7–20)
CALCIUM SERPL-MCNC: 9.4 MG/DL (ref 8.3–10.6)
CARBOXYHEMOGLOBIN: 1.9 % (ref 0–1.5)
CHLORIDE BLD-SCNC: 89 MMOL/L (ref 99–110)
CO2: 34 MMOL/L (ref 21–32)
CREAT SERPL-MCNC: 1.1 MG/DL (ref 0.8–1.3)
GFR AFRICAN AMERICAN: >60
GFR NON-AFRICAN AMERICAN: >60
GLOBULIN: 5 G/DL
GLUCOSE BLD-MCNC: 96 MG/DL (ref 70–99)
HCO3 VENOUS: 39.5 MMOL/L (ref 23–29)
HCT VFR BLD CALC: 37.9 % (ref 40.5–52.5)
HEMOGLOBIN: 12.7 G/DL (ref 13.5–17.5)
MCH RBC QN AUTO: 32.2 PG (ref 26–34)
MCHC RBC AUTO-ENTMCNC: 33.4 G/DL (ref 31–36)
MCV RBC AUTO: 96.5 FL (ref 80–100)
METHEMOGLOBIN VENOUS: 0.6 %
O2 CONTENT, VEN: 13 VOL %
O2 SAT, VEN: 66 %
O2 THERAPY: ABNORMAL
PCO2, VEN: 69.5 MMHG (ref 40–50)
PDW BLD-RTO: 14.5 % (ref 12.4–15.4)
PH VENOUS: 7.37 (ref 7.35–7.45)
PLATELET # BLD: 174 K/UL (ref 135–450)
PMV BLD AUTO: 7.7 FL (ref 5–10.5)
PO2, VEN: 37.2 MMHG (ref 25–40)
POTASSIUM SERPL-SCNC: 4.5 MMOL/L (ref 3.5–5.1)
PRO-BNP: 2422 PG/ML (ref 0–449)
RBC # BLD: 3.93 M/UL (ref 4.2–5.9)
SODIUM BLD-SCNC: 135 MMOL/L (ref 136–145)
TCO2 CALC VENOUS: 42 MMOL/L
TOTAL PROTEIN: 8.3 G/DL (ref 6.4–8.2)
TROPONIN: 0.01 NG/ML
WBC # BLD: 9.4 K/UL (ref 4–11)

## 2020-02-12 PROCEDURE — 80053 COMPREHEN METABOLIC PANEL: CPT

## 2020-02-12 PROCEDURE — 83880 ASSAY OF NATRIURETIC PEPTIDE: CPT

## 2020-02-12 PROCEDURE — 94761 N-INVAS EAR/PLS OXIMETRY MLT: CPT

## 2020-02-12 PROCEDURE — 82803 BLOOD GASES ANY COMBINATION: CPT

## 2020-02-12 PROCEDURE — 6370000000 HC RX 637 (ALT 250 FOR IP): Performed by: INTERNAL MEDICINE

## 2020-02-12 PROCEDURE — 99285 EMERGENCY DEPT VISIT HI MDM: CPT

## 2020-02-12 PROCEDURE — 96374 THER/PROPH/DIAG INJ IV PUSH: CPT

## 2020-02-12 PROCEDURE — 6360000002 HC RX W HCPCS: Performed by: PHYSICIAN ASSISTANT

## 2020-02-12 PROCEDURE — 93005 ELECTROCARDIOGRAM TRACING: CPT | Performed by: EMERGENCY MEDICINE

## 2020-02-12 PROCEDURE — 1200000000 HC SEMI PRIVATE

## 2020-02-12 PROCEDURE — 85027 COMPLETE CBC AUTOMATED: CPT

## 2020-02-12 PROCEDURE — 71046 X-RAY EXAM CHEST 2 VIEWS: CPT

## 2020-02-12 PROCEDURE — 94150 VITAL CAPACITY TEST: CPT

## 2020-02-12 PROCEDURE — 2700000000 HC OXYGEN THERAPY PER DAY

## 2020-02-12 PROCEDURE — 84484 ASSAY OF TROPONIN QUANT: CPT

## 2020-02-12 PROCEDURE — 2580000003 HC RX 258: Performed by: INTERNAL MEDICINE

## 2020-02-12 RX ORDER — FUROSEMIDE 10 MG/ML
40 INJECTION INTRAMUSCULAR; INTRAVENOUS 2 TIMES DAILY
Status: DISCONTINUED | OUTPATIENT
Start: 2020-02-13 | End: 2020-02-17 | Stop reason: HOSPADM

## 2020-02-12 RX ORDER — CHOLECALCIFEROL (VITAMIN D3) 125 MCG
5 CAPSULE ORAL NIGHTLY PRN
Status: DISCONTINUED | OUTPATIENT
Start: 2020-02-12 | End: 2020-02-17 | Stop reason: HOSPADM

## 2020-02-12 RX ORDER — ACETAMINOPHEN 325 MG/1
650 TABLET ORAL EVERY 6 HOURS PRN
Status: DISCONTINUED | OUTPATIENT
Start: 2020-02-12 | End: 2020-02-17 | Stop reason: HOSPADM

## 2020-02-12 RX ORDER — TORSEMIDE 20 MG/1
20 TABLET ORAL
COMMUNITY
Start: 2020-02-06

## 2020-02-12 RX ORDER — FUROSEMIDE 10 MG/ML
40 INJECTION INTRAMUSCULAR; INTRAVENOUS ONCE
Status: COMPLETED | OUTPATIENT
Start: 2020-02-12 | End: 2020-02-12

## 2020-02-12 RX ORDER — ONDANSETRON 2 MG/ML
4 INJECTION INTRAMUSCULAR; INTRAVENOUS EVERY 6 HOURS PRN
Status: DISCONTINUED | OUTPATIENT
Start: 2020-02-12 | End: 2020-02-17 | Stop reason: HOSPADM

## 2020-02-12 RX ORDER — PANTOPRAZOLE SODIUM 40 MG/1
40 TABLET, DELAYED RELEASE ORAL
Status: DISCONTINUED | OUTPATIENT
Start: 2020-02-13 | End: 2020-02-17 | Stop reason: HOSPADM

## 2020-02-12 RX ORDER — SODIUM CHLORIDE 0.9 % (FLUSH) 0.9 %
10 SYRINGE (ML) INJECTION PRN
Status: DISCONTINUED | OUTPATIENT
Start: 2020-02-12 | End: 2020-02-17 | Stop reason: HOSPADM

## 2020-02-12 RX ORDER — SIMVASTATIN 40 MG
40 TABLET ORAL NIGHTLY
Status: DISCONTINUED | OUTPATIENT
Start: 2020-02-12 | End: 2020-02-17 | Stop reason: HOSPADM

## 2020-02-12 RX ORDER — M-VIT,TX,IRON,MINS/CALC/FOLIC 27MG-0.4MG
1 TABLET ORAL DAILY
Status: DISCONTINUED | OUTPATIENT
Start: 2020-02-12 | End: 2020-02-17 | Stop reason: HOSPADM

## 2020-02-12 RX ORDER — ASPIRIN 81 MG/1
81 TABLET ORAL DAILY
Status: DISCONTINUED | OUTPATIENT
Start: 2020-02-12 | End: 2020-02-17 | Stop reason: HOSPADM

## 2020-02-12 RX ORDER — SODIUM CHLORIDE 0.9 % (FLUSH) 0.9 %
10 SYRINGE (ML) INJECTION EVERY 12 HOURS SCHEDULED
Status: DISCONTINUED | OUTPATIENT
Start: 2020-02-12 | End: 2020-02-17 | Stop reason: HOSPADM

## 2020-02-12 RX ORDER — IPRATROPIUM BROMIDE AND ALBUTEROL SULFATE 2.5; .5 MG/3ML; MG/3ML
1 SOLUTION RESPIRATORY (INHALATION) EVERY 4 HOURS PRN
Status: DISCONTINUED | OUTPATIENT
Start: 2020-02-12 | End: 2020-02-17 | Stop reason: HOSPADM

## 2020-02-12 RX ORDER — FERROUS GLUCONATE 324(37.5)
324 TABLET ORAL
Status: DISCONTINUED | OUTPATIENT
Start: 2020-02-13 | End: 2020-02-17 | Stop reason: HOSPADM

## 2020-02-12 RX ORDER — METOPROLOL SUCCINATE 25 MG/1
25 TABLET, EXTENDED RELEASE ORAL
Status: ON HOLD | COMMUNITY
Start: 2020-02-06 | End: 2020-02-17 | Stop reason: HOSPADM

## 2020-02-12 RX ADMIN — FUROSEMIDE 40 MG: 10 INJECTION, SOLUTION INTRAMUSCULAR; INTRAVENOUS at 16:48

## 2020-02-12 RX ADMIN — SIMVASTATIN 40 MG: 40 TABLET, FILM COATED ORAL at 22:06

## 2020-02-12 RX ADMIN — ASPIRIN 81 MG: 81 TABLET, COATED ORAL at 22:05

## 2020-02-12 RX ADMIN — MELATONIN TAB 5 MG 5 MG: 5 TAB at 22:05

## 2020-02-12 RX ADMIN — Medication 10 ML: at 22:06

## 2020-02-12 RX ADMIN — METOPROLOL TARTRATE 25 MG: 25 TABLET ORAL at 22:05

## 2020-02-12 ASSESSMENT — PAIN DESCRIPTION - LOCATION
LOCATION: CHEST

## 2020-02-12 ASSESSMENT — PAIN SCALES - GENERAL
PAINLEVEL_OUTOF10: 2
PAINLEVEL_OUTOF10: 4
PAINLEVEL_OUTOF10: 3

## 2020-02-12 ASSESSMENT — PAIN DESCRIPTION - ORIENTATION
ORIENTATION: LEFT

## 2020-02-12 ASSESSMENT — PAIN DESCRIPTION - DESCRIPTORS
DESCRIPTORS: PRESSURE
DESCRIPTORS: PRESSURE

## 2020-02-12 ASSESSMENT — PAIN DESCRIPTION - PAIN TYPE
TYPE: ACUTE PAIN

## 2020-02-12 NOTE — ED NOTES
Ambulated pt 100ft on 3L with a pulse ox. Pt ambulated with a cane. Pt's spo2 dropped down to 84% while walking and 79% once back in bed. Pt's spo2 went back up 91% once comfortable in bed and sat for a few minutes.  Heart rate was 85-92bpm.     Stephanie King  02/12/20 5680

## 2020-02-12 NOTE — ED NOTES
Bed: 17  Expected date:   Expected time:   Means of arrival:   Comments:     Paramjit Yun RN  02/12/20 1732

## 2020-02-12 NOTE — ED PROVIDER NOTES
[facility] Emergency Department    CHIEF COMPLAINT  Chest Pain (Patient comes into ED with complains of chest pain and bilateral extremity swelling and shortness of breath. )      HISTORY OF PRESENT ILLNESS  Blaire Quevedo is a 80 y.o. male who presents to the ED complaining of several day history of chest discomfort with leg swelling and shortness of breath. Patient brought in by squad for evaluation. Patient with history of CHF. States that he has been taking medications as prescribed. Over past several days has had worsening leg swelling and shortness of breath with some chest discomfort at times. .  Patient denies headaches, lightheadedness, dizziness or confusion. No fevers or chills. No neck, arm, jaw or back pain. Denies abdominal pain. No nausea or vomiting. No urinary symptoms. No other complaints, modifying factors or associated symptoms. Nursing notes reviewed. Past Medical History:   Diagnosis Date    Abnormal stress test 10/30/2013    CAD (coronary artery disease)     GERD (gastroesophageal reflux disease)     Hx of blood clots     DVT    Hyperlipidemia      Past Surgical History:   Procedure Laterality Date    CAROTID ENDARTERECTOMY Bilateral     CATARACT REMOVAL WITH IMPLANT Right 08/24/2016    CATARACT REMOVAL WITH IMPLANT Left 09/14/2016    COLONOSCOPY      COLONOSCOPY  5/13/2014    CORONARY ARTERY BYPASS GRAFT      HERNIA REPAIR      LAPAROSCOPY  11/23/2014    laparoscopic lysis of adhesions, excision of sigmoid epiploica    MITRAL VALVE REPLACEMENT      PACEMAKER INSERTION       Family History   Problem Relation Age of Onset    Heart Disease Brother      Social History     Socioeconomic History    Marital status:       Spouse name: Not on file    Number of children: Not on file    Years of education: Not on file    Highest education level: Not on file   Occupational History    Not on file   Social Needs    Financial resource strain: Not on file    Food insecurity:     Worry: Not on file     Inability: Not on file    Transportation needs:     Medical: Not on file     Non-medical: Not on file   Tobacco Use    Smoking status: Former Smoker     Last attempt to quit: 1972     Years since quittin.5    Smokeless tobacco: Never Used   Substance and Sexual Activity    Alcohol use: No    Drug use: No    Sexual activity: Not on file   Lifestyle    Physical activity:     Days per week: Not on file     Minutes per session: Not on file    Stress: Not on file   Relationships    Social connections:     Talks on phone: Not on file     Gets together: Not on file     Attends Worship service: Not on file     Active member of club or organization: Not on file     Attends meetings of clubs or organizations: Not on file     Relationship status: Not on file    Intimate partner violence:     Fear of current or ex partner: Not on file     Emotionally abused: Not on file     Physically abused: Not on file     Forced sexual activity: Not on file   Other Topics Concern    Not on file   Social History Narrative    Not on file     Current Facility-Administered Medications   Medication Dose Route Frequency Provider Last Rate Last Dose    aspirin EC tablet 81 mg  81 mg Oral Daily Koby Yap MD   81 mg at 20    pantoprazole (PROTONIX) tablet 40 mg  40 mg Oral QAM AC Koby Yap MD        melatonin tablet 5 mg  5 mg Oral Nightly PRN Koby Yap MD   5 mg at 20    metoprolol tartrate (LOPRESSOR) tablet 25 mg  25 mg Oral BID Koby Yap MD   25 mg at 20    simvastatin (ZOCOR) tablet 40 mg  40 mg Oral Nightly Koby Yap MD   40 mg at 20    sodium chloride flush 0.9 % injection 10 mL  10 mL Intravenous 2 times per day Koby Yap MD   10 mL at 20    sodium chloride flush 0.9 % injection 10 mL  10 mL Intravenous PRN procedures.     MDM  Results for orders placed or performed during the hospital encounter of 02/12/20   Comprehensive metabolic panel   Result Value Ref Range    Sodium 135 (L) 136 - 145 mmol/L    Potassium 4.5 3.5 - 5.1 mmol/L    Chloride 89 (L) 99 - 110 mmol/L    CO2 34 (H) 21 - 32 mmol/L    Anion Gap 12 3 - 16    Glucose 96 70 - 99 mg/dL    BUN 29 (H) 7 - 20 mg/dL    CREATININE 1.1 0.8 - 1.3 mg/dL    GFR Non-African American >60 >60    GFR African American >60 >60    Calcium 9.4 8.3 - 10.6 mg/dL    Total Protein 8.3 (H) 6.4 - 8.2 g/dL    Alb 3.3 (L) 3.4 - 5.0 g/dL    Albumin/Globulin Ratio 0.7 (L) 1.1 - 2.2    Total Bilirubin 0.6 0.0 - 1.0 mg/dL    Alkaline Phosphatase 176 (H) 40 - 129 U/L    ALT 24 10 - 40 U/L    AST 39 (H) 15 - 37 U/L    Globulin 5.0 g/dL   CBC   Result Value Ref Range    WBC 9.4 4.0 - 11.0 K/uL    RBC 3.93 (L) 4.20 - 5.90 M/uL    Hemoglobin 12.7 (L) 13.5 - 17.5 g/dL    Hematocrit 37.9 (L) 40.5 - 52.5 %    MCV 96.5 80.0 - 100.0 fL    MCH 32.2 26.0 - 34.0 pg    MCHC 33.4 31.0 - 36.0 g/dL    RDW 14.5 12.4 - 15.4 %    Platelets 115 640 - 901 K/uL    MPV 7.7 5.0 - 10.5 fL   Troponin   Result Value Ref Range    Troponin 0.01 <0.01 ng/mL   Brain Natriuretic Peptide   Result Value Ref Range    Pro-BNP 2,422 (H) 0 - 449 pg/mL   Blood gas, venous   Result Value Ref Range    pH, Sixto 7.372 7.350 - 7.450    pCO2, Sixto 69.5 (H) 40.0 - 50.0 mmHg    pO2, Sixto 37.2 25.0 - 40.0 mmHg    HCO3, Venous 39.5 (H) 23.0 - 29.0 mmol/L    Base Excess, Sixto 11.2 (H) -3.0 - 3.0 mmol/L    O2 Sat, Sixto 66 Not Established %    Carboxyhemoglobin 1.9 (H) 0.0 - 1.5 %    MetHgb, Sixto 0.6 <1.5 %    TC02 (Calc), Sixto 42 Not Established mmol/L    O2 Content, Sixto 13 Not Established VOL %    O2 Therapy Unknown    EKG 12 Lead   Result Value Ref Range    Ventricular Rate 76 BPM    Atrial Rate 75 BPM    QRS Duration 134 ms    Q-T Interval 410 ms    QTc Calculation (Bazett) 461 ms    R Axis -35 degrees    T Axis 2 degrees    Diagnosis

## 2020-02-13 PROBLEM — I50.9 CHF (CONGESTIVE HEART FAILURE) (HCC): Status: ACTIVE | Noted: 2020-02-13

## 2020-02-13 LAB
ANION GAP SERPL CALCULATED.3IONS-SCNC: 10 MMOL/L (ref 3–16)
BUN BLDV-MCNC: 27 MG/DL (ref 7–20)
CALCIUM SERPL-MCNC: 9.5 MG/DL (ref 8.3–10.6)
CHLORIDE BLD-SCNC: 88 MMOL/L (ref 99–110)
CHOLESTEROL, TOTAL: 114 MG/DL (ref 0–199)
CO2: 39 MMOL/L (ref 21–32)
CREAT SERPL-MCNC: 1.1 MG/DL (ref 0.8–1.3)
EKG ATRIAL RATE: 75 BPM
EKG DIAGNOSIS: NORMAL
EKG Q-T INTERVAL: 410 MS
EKG QRS DURATION: 134 MS
EKG QTC CALCULATION (BAZETT): 461 MS
EKG R AXIS: -35 DEGREES
EKG T AXIS: 2 DEGREES
EKG VENTRICULAR RATE: 76 BPM
GFR AFRICAN AMERICAN: >60
GFR NON-AFRICAN AMERICAN: >60
GLUCOSE BLD-MCNC: 99 MG/DL (ref 70–99)
HDLC SERPL-MCNC: 46 MG/DL (ref 40–60)
LDL CHOLESTEROL CALCULATED: 55 MG/DL
MAGNESIUM: 2.1 MG/DL (ref 1.8–2.4)
POTASSIUM SERPL-SCNC: 4.3 MMOL/L (ref 3.5–5.1)
SODIUM BLD-SCNC: 137 MMOL/L (ref 136–145)
TRIGL SERPL-MCNC: 65 MG/DL (ref 0–150)
VLDLC SERPL CALC-MCNC: 13 MG/DL

## 2020-02-13 PROCEDURE — 6370000000 HC RX 637 (ALT 250 FOR IP): Performed by: INTERNAL MEDICINE

## 2020-02-13 PROCEDURE — 36415 COLL VENOUS BLD VENIPUNCTURE: CPT

## 2020-02-13 PROCEDURE — 6360000002 HC RX W HCPCS: Performed by: INTERNAL MEDICINE

## 2020-02-13 PROCEDURE — 93010 ELECTROCARDIOGRAM REPORT: CPT | Performed by: INTERNAL MEDICINE

## 2020-02-13 PROCEDURE — 1200000000 HC SEMI PRIVATE

## 2020-02-13 PROCEDURE — 94761 N-INVAS EAR/PLS OXIMETRY MLT: CPT

## 2020-02-13 PROCEDURE — 80048 BASIC METABOLIC PNL TOTAL CA: CPT

## 2020-02-13 PROCEDURE — 6370000000 HC RX 637 (ALT 250 FOR IP): Performed by: NURSE PRACTITIONER

## 2020-02-13 PROCEDURE — 2700000000 HC OXYGEN THERAPY PER DAY

## 2020-02-13 PROCEDURE — 80061 LIPID PANEL: CPT

## 2020-02-13 PROCEDURE — 2580000003 HC RX 258: Performed by: INTERNAL MEDICINE

## 2020-02-13 PROCEDURE — 83735 ASSAY OF MAGNESIUM: CPT

## 2020-02-13 RX ADMIN — METOPROLOL TARTRATE 25 MG: 25 TABLET ORAL at 20:10

## 2020-02-13 RX ADMIN — PANTOPRAZOLE SODIUM 40 MG: 40 TABLET, DELAYED RELEASE ORAL at 06:11

## 2020-02-13 RX ADMIN — Medication 10 ML: at 09:33

## 2020-02-13 RX ADMIN — FUROSEMIDE 40 MG: 10 INJECTION, SOLUTION INTRAMUSCULAR; INTRAVENOUS at 18:06

## 2020-02-13 RX ADMIN — Medication 10 ML: at 20:10

## 2020-02-13 RX ADMIN — Medication 324 MG: at 09:33

## 2020-02-13 RX ADMIN — MELATONIN TAB 5 MG 5 MG: 5 TAB at 23:40

## 2020-02-13 RX ADMIN — FUROSEMIDE 40 MG: 10 INJECTION, SOLUTION INTRAMUSCULAR; INTRAVENOUS at 09:32

## 2020-02-13 RX ADMIN — ACETAMINOPHEN 650 MG: 325 TABLET ORAL at 03:14

## 2020-02-13 RX ADMIN — Medication 1 TABLET: at 09:32

## 2020-02-13 RX ADMIN — SIMVASTATIN 40 MG: 40 TABLET, FILM COATED ORAL at 20:10

## 2020-02-13 ASSESSMENT — PAIN SCALES - GENERAL
PAINLEVEL_OUTOF10: 0
PAINLEVEL_OUTOF10: 5
PAINLEVEL_OUTOF10: 0

## 2020-02-13 NOTE — ED NOTES
Patient to the floor via wheel chair on 3L NC. Patient is alert and oriented. Personal belongings to lap. Report to Alfredo Brian RN at bedside on floor.  No family with patient     Boris Mccarthy RN  02/12/20 8467

## 2020-02-13 NOTE — PLAN OF CARE
Problem: OXYGENATION/RESPIRATORY FUNCTION  Goal: Patient will achieve/maintain normal respiratory rate/effort  Description  Respiratory rate and effort will be within normal limits for the patient  Outcome: Ongoing     Patient's EF (Ejection Fraction) is greater than 40%    Heart Failure Medications:  Diuretics[de-identified] Furosemide    (One of the following REQUIRED for EF <40%/SYSTOLIC FAILURE but MAY be used in EF% >40%/DIASTOLIC FAILURE)        ACE[de-identified] None        ARB[de-identified] None         ARNI[de-identified] None    (Beta Blockers)  NON- Evidenced Based Beta Blocker (for EF% >40%/DIASTOLIC FAILURE): Metoprolol TARTrate- Lopressor    Evidenced Based Beta Blocker::(REQUIRED for EF% <40%/SYSTOLIC FAILURE) Metoprolol SUCCinate- Toprol XL  . .................................................................................................................................................. Patient's weights and intake/output reviewed: Yes    Patient's Last Weight: 166 lbs obtained by standing scale. Difference of 2 lbs less than last documented weight. Intake/Output Summary (Last 24 hours) at 2/13/2020 1419  Last data filed at 2/13/2020 0856  Gross per 24 hour   Intake 240 ml   Output 1150 ml   Net -910 ml       Comorbidities Reviewed Yes    Patient has a past medical history of Abnormal stress test, CAD (coronary artery disease), GERD (gastroesophageal reflux disease), Hx of blood clots, and Hyperlipidemia. >>For CHF and Comorbidity documentation on Education Time and Topics, please see Education Tab    Progressive Mobility Assessment:  What is this patient's Current Level of Mobility?: Ambulatory- with Assistance  How was this patient Mobilized today?: Edge of Bed, Up to Chair, and Up in Room                 With Whom? Nurse and PCA                 Level of Difficulty/Assistance: 1x Assist     Pt up in chair at this time on 3 L O2. Pt denies shortness of breath. Pt with pitting lower extremity edema.      Patient and/or Family's stated Goal of Care this Admission: reduce shortness of breath, increase activity tolerance, better understand heart failure and disease management, be more comfortable, and reduce lower extremity edema prior to discharge        :

## 2020-02-13 NOTE — PLAN OF CARE
Problem: Falls - Risk of:  Goal: Will remain free from falls  Description  Will remain free from falls  Outcome: Ongoing     Problem: OXYGENATION/RESPIRATORY FUNCTION  Goal: Patient will achieve/maintain normal respiratory rate/effort  Description  Respiratory rate and effort will be within normal limits for the patient  Outcome: Ongoing       Patient's EF (Ejection Fraction) is greater than 40%    Heart Failure Medications:  Diuretics[de-identified] Furosemide  ACE[de-identified] None  ARB[de-identified] None  ARNI[de-identified] None  Evidenced Based Beta Blocker[de-identified] Metoprolol SUCCinate- Toprol XL    Patient's weights and intake/output reviewed: Yes    Patient's Last Weight: 168.9  lbs obtained by standing scale. This is an admission weight. Intake/Output Summary (Last 24 hours) at 2/12/2020 2336  Last data filed at 2/12/2020 2218  Gross per 24 hour   Intake --   Output 730 ml   Net -730 ml       Comorbidities Reviewed Yes    Patient has a past medical history of Abnormal stress test, CAD (coronary artery disease), GERD (gastroesophageal reflux disease), Hx of blood clots, and Hyperlipidemia. >>For CHF and Comorbidity documentation on Education Time and Topics, please see Education Tab    Progressive Mobility Assessment:  What is this patient's Current Level of Mobility?: Ambulatory- with Assistance  How was this patient Mobilized today?:  Up to Toilet/Shower, Up in Room, and Up in [address] [facility]? Nurse and PCA                 Level of Difficulty/Assistance: 1x Assist     Pt resting in bed at this time on 3 L O2. Pt with complaints of shortness of breath. Pt with pitting lower extremity edema.      Patient and/or Family's stated Goal of Care this Admission: reduce shortness of breath, increase activity tolerance, and reduce lower extremity edema prior to discharge        :

## 2020-02-13 NOTE — PROGRESS NOTES
RESPIRATORY THERAPY ASSESSMENT    Name:  Raheel [facility] Record Number:  [id number]  Age: 80 y.o. Gender: male  : 1932  Today's Date:  2020  Room:  0103/0103-01    Assessment     Is the patient being admitted for a COPD or Asthma exacerbation? No   (If yes the patient will be seen every 4 hours for the first 24 hours and then reassessed)    Patient Admission Diagnosis      Allergies  Allergies   Allergen Reactions    Ambien  [Zolpidem Tartrate] Other (See Comments)     Confusion       Minimum Predicted Vital Capacity:     992          Actual Vital Capacity:      250              Pulmonary History:COPD and CHF/Pulmonary Edema  Home Oxygen Therapy:  3 liters/min via nasal cannula  Home Respiratory Therapy:pt states he takes a mdi bid/pt is unsure of home medication   Current Respiratory Therapy:  duoneb prn  Treatment Type: IS       Respiratory Severity Index(RSI)   Patients with orders for inhalation medications, oxygen, or any therapeutic treatment modality will be placed on Respiratory Protocol. They will be assessed with the first treatment and at least every 72 hours thereafter. The following severity scale will be used to determine frequency of treatment intervention.     Smoking History: Smoking History Less than 1ppd or less than 15 pack year = 1    Social History  Social History     Tobacco Use    Smoking status: Former Smoker     Last attempt to quit: 1972     Years since quittin.11    Smokeless tobacco: Never Used   Substance Use Topics    Alcohol use: No    Drug use: No       Recent Surgical History: None = 0  Past Surgical History  Past Surgical History:   Procedure Laterality Date    CAROTID ENDARTERECTOMY Bilateral     CATARACT REMOVAL WITH IMPLANT Right 2016    CATARACT REMOVAL WITH IMPLANT Left 2016    COLONOSCOPY      COLONOSCOPY  2014    CORONARY ARTERY BYPASS GRAFT      HERNIA REPAIR      LAPAROSCOPY  2014    laparoscopic lysis of adhesions, excision of sigmoid epiploica    MITRAL VALVE REPLACEMENT      PACEMAKER INSERTION         Level of Consciousness: Alert, Oriented, and Cooperative = 0    Level of Activity: Walking with assistance = 1    Respiratory Pattern: Dyspnea with exertion;Irregular pattern;or RR less than 6 = 2    Breath Sounds: Diminshed bilaterally and/or crackles = 2    Sputum   ,  ,    Cough: Strong, spontaneous, non-productive = 0    Vital Signs   /73   Pulse 91   Temp 97.3 °F (36.3 °C) (Oral)   Resp 18   Ht 5' 7\" (1.702 m)   Wt 168 lb 3.2 oz (76.3 kg)   SpO2 90%   BMI 26.34 kg/m²   SPO2 (COPD values may differ): 88-89% on room air or greater than 92% on FiO2 28- 35% = 2    Peak Flow (asthma only): not applicable = 0    RSI: 7-8 = BID and Q4HPRN (every four hours as needed) for dyspnea        Plan       Goals: medication delivery    Patient/caregiver was educated on the proper method of use for Respiratory Care Devices:  Yes      Level of patient/caregiver understanding able to:   ? Verbalize understanding   ? Demonstrate understanding       ? Teach back        ? Needs reinforcement       ? No available caregiver               ? Other:     Response to education:  Very Good     Is patient being placed on Home Treatment Regimen? No    Does the patient have everything they need prior to discharge? NA     Comments: pt seen and chart reviewed    Plan of Care: pt request to keep duoneb PRN    Electronically signed by Joana Ramires RCP on 2/12/2020 at 9:53 PM    Respiratory Protocol Guidelines     1. Assessment and treatment by Respiratory Therapy will be initiated for medication and therapeutic interventions upon initiation of aerosolized medication. 2. Physician will be contacted for respiratory rate (RR) greater than 35 breaths per minute. Therapy will be held for heart rate (HR) greater than 140 beats per minute, pending direction from physician.   3. Bronchodilators will be administered via Metered Dose Inhaler (MDI) with spacer when the following criteria are met:  a. Alert and cooperative     b. HR < 140 bpm  c. RR < 30 bpm                d. Can demonstrate a 2-3 second inspiratory hold  4. Bronchodilators will be administered via Hand Held Nebulizer JOVANNY [facility]) to patients when ANY of the following criteria are met  a. Incognizant or uncooperative          b. Patients treated with HHN at Home        c. Unable to demonstrate proper use of MDI with spacer     d. RR > 30 bpm   5. Bronchodilators will be delivered via Metered Dose Inhaler (MDI), HHN, Aerogen to intubated patients on mechanical ventilation. 6. Inhalation medication orders will be delivered and/or substituted as outlined below. Aerosolized Medications Ordering and Administration Guidelines:    1. All Medications will be ordered by a physician, and their frequency and/or modality will be adjusted as defined by the patients Respiratory Severity Index (RSI) score. 2. If the patient does not have documented COPD, consider discontinuing anticholinergics when RSI is less than 9.  3. If the bronchospasm worsens (increased RSI), then the bronchodilator frequency can be increased to a maximum of every 4 hours. If greater than every 4 hours is required, the physician will be contacted. 4. If the bronchospasm improves, the frequency of the bronchodilator can be decreased, based on the patient's RSI, but not less than home treatment regimen frequency. 5. Bronchodilator(s) will be discontinued if patient has a RSI less than 9 and has received no scheduled or as needed treatment for 72  Hrs. Patients Ordered on a Mucolytic Agent:    1. Must always be administered with a bronchodilator. 2. Discontinue if patient experiences worsened bronchospasm, or secretions have lessened to the point that the patient is able to clear them with a cough. Anti-inflammatory and Combination Medications:    1.  If the patient lacks prior history of lung disease,

## 2020-02-13 NOTE — CONSULTS
Nutrition Education    Type and Reason for Visit: Consult, Patient Education    Nutrition Assessment:  Consulted for CHF education. Educated pt on low sodium, fluid restriction and weight monitoring. Pt reports not adding any extra salt to his meals and buying canned foods with no added salt. Pt states he goes out to eat 1-2 times per week (normally [employer] or [employer]), encouraged pt to look at menus for sodium content. Pt endorses only cooking simple meals for himself and does not use any spices to improve taste, was not interested in adding spices. Encouraged pt to limit fluid comsuption to 64 oz per day or per MD. Pt reports taking daily weights currently and states he will continue. Will continue to monitor. · Verbally reviewed information with Patient and Family  · Written educational materials provided. · Contact name and number provided. · Refer to Patient Education activity for more details.     Electronically signed by Bill Thomas MS, RD, LD on 2/13/20 at 11:53 AM    Contact Number: Office: [phone]

## 2020-02-13 NOTE — H&P
deformity. Skin: Skin color, texture, turgor normal.  No rashes or lesions. Neurologic:  Neurovascularly intact without any focal sensory/motor deficits. Cranial nerves: II-XII intact, grossly non-focal.  Psychiatric:  Alert and oriented, thought content appropriate, normal insight  Capillary Refill: Brisk,< 3 seconds   Peripheral Pulses: +2 palpable, equal bilaterally       Labs:     Recent Labs     02/12/20  1555   WBC 9.4   HGB 12.7*   HCT 37.9*        Recent Labs     02/12/20  1555   *   K 4.5   CL 89*   CO2 34*   BUN 29*   CREATININE 1.1   CALCIUM 9.4     Recent Labs     02/12/20  1555   AST 39*   ALT 24   BILITOT 0.6   ALKPHOS 176*     No results for input(s): INR in the last 72 hours. Recent Labs     02/12/20  1555   TROPONINI 0.01       Urinalysis:      Lab Results   Component Value Date    NITRU Negative 11/19/2014    BLOODU SMALL 11/19/2014    SPECGRAV >=1.030 11/19/2014    GLUCOSEU Negative 11/19/2014       Radiology:     CXR: I have reviewed the CXR with the following interpretation: Progressing edema. Bilateral pleural effusions. New indeterminate nodular opacities within mid to upper lungs. EKG:  I have reviewed the EKG with the following interpretation: A-paced with rate of 76. Left axis deviation. RBBB. XR CHEST STANDARD (2 VW)   Final Result   Progressing edema. Bilateral pleural effusions. New indeterminate nodules bilaterally. Recommend CT correlation with IV   contrast.             ASSESSMENT:    Active Hospital Problems    Diagnosis Date Noted    Diastolic CHF, acute ([facility] [address] [I50.31] 02/12/2020    CHF (congestive heart failure), NYHA class II, acute on chronic, diastolic ([facility]) [P42.29] 78/46/5498         PLAN:    Acute on chronic diastolic congestive heart failure, grade II, decompensated. Extensive heart hx: hx CAD s/p CABG, aortic stenosis S/P AVR in 2013.   -BNP 2422 on admission  -Last echo on 8/14/2019:               Vigorous left ventricle systolic function with EF of 65 to 70%. Dynamic obstruction in the mid cavity. Abnormal left ventricular relaxation, grade 1 diastolic dysfunction. Bioprosthetic valve present in aortic valve. Moderate focal thickening and calcification of anterior leaflet and posterior  leaflet of the mitral valve. Pacer wire noted in the right ventricle.  -Limited echo on 11/6/2019 revealed: Normal LV systolic function with EF 55 to 60%. -CXR revealed progressing edema. -IV lasix ordered in ED, ordered BID dosing  -CHF nurse consulted  -Will consider consulting cards if patient does not improve  -Low-sodium diet  -Daily weights, I's and O's, oxygen therapy protocol as needed  -Metoprolol twice daily  -Telemetry monitoring     HX of moderate-severe COPD with O2 dependence, does not appear to be in acute exacerbation.  -Oxygen therapy protocol  -Duoneb as needed     HyperLipidemia - controlled on home Statin.   -Continue home simvastatin, w/ f/u and med adjustment w/ PCP  -Check FLP    DVT Prophylaxis: Lovenox  Diet: DIET LOW SODIUM 2 GM;  Code Status: Full Code    PT/OT Eval Status: Not ordered    Dispo -pending clinical improvement, admit to [facility] telemetry       [address] Epifanio Lang - NP    Thank you Etelvina Padilla MD for the opportunity to be involved in this patient's care.  If you have any questions or concerns please feel free to contact me at [phone].  ---------------------------Anticipated Dr. Maricarmen kearns------------------------------

## 2020-02-14 LAB
ANION GAP SERPL CALCULATED.3IONS-SCNC: 7 MMOL/L (ref 3–16)
BUN BLDV-MCNC: 32 MG/DL (ref 7–20)
CALCIUM SERPL-MCNC: 9.3 MG/DL (ref 8.3–10.6)
CHLORIDE BLD-SCNC: 86 MMOL/L (ref 99–110)
CO2: 41 MMOL/L (ref 21–32)
CREAT SERPL-MCNC: 1.2 MG/DL (ref 0.8–1.3)
GFR AFRICAN AMERICAN: >60
GFR NON-AFRICAN AMERICAN: 57
GLUCOSE BLD-MCNC: 96 MG/DL (ref 70–99)
MAGNESIUM: 2.2 MG/DL (ref 1.8–2.4)
POTASSIUM SERPL-SCNC: 3.9 MMOL/L (ref 3.5–5.1)
SODIUM BLD-SCNC: 134 MMOL/L (ref 136–145)

## 2020-02-14 PROCEDURE — 83735 ASSAY OF MAGNESIUM: CPT

## 2020-02-14 PROCEDURE — 1200000000 HC SEMI PRIVATE

## 2020-02-14 PROCEDURE — 2580000003 HC RX 258: Performed by: INTERNAL MEDICINE

## 2020-02-14 PROCEDURE — 6370000000 HC RX 637 (ALT 250 FOR IP): Performed by: INTERNAL MEDICINE

## 2020-02-14 PROCEDURE — 2700000000 HC OXYGEN THERAPY PER DAY

## 2020-02-14 PROCEDURE — 6360000002 HC RX W HCPCS: Performed by: INTERNAL MEDICINE

## 2020-02-14 PROCEDURE — 94761 N-INVAS EAR/PLS OXIMETRY MLT: CPT

## 2020-02-14 PROCEDURE — 80048 BASIC METABOLIC PNL TOTAL CA: CPT

## 2020-02-14 PROCEDURE — 36415 COLL VENOUS BLD VENIPUNCTURE: CPT

## 2020-02-14 PROCEDURE — 6370000000 HC RX 637 (ALT 250 FOR IP): Performed by: NURSE PRACTITIONER

## 2020-02-14 PROCEDURE — 6370000000 HC RX 637 (ALT 250 FOR IP)

## 2020-02-14 RX ADMIN — FUROSEMIDE 40 MG: 10 INJECTION, SOLUTION INTRAMUSCULAR; INTRAVENOUS at 18:15

## 2020-02-14 RX ADMIN — SIMVASTATIN 40 MG: 40 TABLET, FILM COATED ORAL at 21:13

## 2020-02-14 RX ADMIN — MELATONIN TAB 5 MG 5 MG: 5 TAB at 21:12

## 2020-02-14 RX ADMIN — ENOXAPARIN SODIUM 40 MG: 40 INJECTION SUBCUTANEOUS at 10:06

## 2020-02-14 RX ADMIN — Medication: at 21:20

## 2020-02-14 RX ADMIN — PANTOPRAZOLE SODIUM 40 MG: 40 TABLET, DELAYED RELEASE ORAL at 06:17

## 2020-02-14 RX ADMIN — ASPIRIN 81 MG: 81 TABLET, COATED ORAL at 10:06

## 2020-02-14 RX ADMIN — Medication 10 ML: at 21:13

## 2020-02-14 RX ADMIN — METOPROLOL TARTRATE 25 MG: 25 TABLET ORAL at 21:13

## 2020-02-14 RX ADMIN — Medication 10 ML: at 10:08

## 2020-02-14 RX ADMIN — Medication 1 TABLET: at 10:06

## 2020-02-14 RX ADMIN — FUROSEMIDE 40 MG: 10 INJECTION, SOLUTION INTRAMUSCULAR; INTRAVENOUS at 10:06

## 2020-02-14 ASSESSMENT — PAIN SCALES - GENERAL
PAINLEVEL_OUTOF10: 0

## 2020-02-14 NOTE — PROGRESS NOTES
Hospitalist Progress Note      PCP: Osito Bland MD    Date of Admission: 2/12/2020    Chief Complaint: SOB    Subjective: no new c/o. Medications:  Reviewed    Infusion Medications   Scheduled Medications    aspirin  81 mg Oral Daily    pantoprazole  40 mg Oral QAM AC    metoprolol tartrate  25 mg Oral BID    simvastatin  40 mg Oral Nightly    sodium chloride flush  10 mL Intravenous 2 times per day    enoxaparin  40 mg Subcutaneous Daily    furosemide  40 mg Intravenous BID    ferrous gluconate  324 mg Oral Daily with breakfast    therapeutic multivitamin-minerals  1 tablet Oral Daily     PRN Meds: melatonin, sodium chloride flush, magnesium hydroxide, ondansetron, acetaminophen, ipratropium-albuterol      Intake/Output Summary (Last 24 hours) at 2/14/2020 0859  Last data filed at 2/14/2020 0416  Gross per 24 hour   Intake 720 ml   Output 1775 ml   Net -1055 ml       Physical Exam Performed:    BP (!) 90/51   Pulse 95   Temp 97.5 °F (36.4 °C) (Oral)   Resp 14   Ht 5' 7\" (1.702 m)   Wt 164 lb 12.8 oz (74.8 kg)   SpO2 95%   BMI 25.81 kg/m²     General appearance: No apparent distress, appears stated age and cooperative. HEENT: Pupils equal, round, and reactive to light. Conjunctivae/corneas clear. Neck: Supple, with full range of motion. No jugular venous distention. Trachea midline. Respiratory:  Normal respiratory effort. Clear to auscultation, bilaterally without Rales/Wheezes/Rhonchi. Cardiovascular: Regular rate and rhythm with normal S1/S2 without murmurs, rubs or gallops. Abdomen: Soft, non-tender, non-distended with normal bowel sounds. Musculoskeletal: No clubbing, cyanosis w/ 2+ LE edema bilaterally. Full range of motion without deformity. Skin: Skin color, texture, turgor normal.  No rashes or lesions. Neurologic:  Neurovascularly intact without any focal sensory/motor deficits.  Cranial nerves: II-XII intact, grossly non-focal.  Psychiatric: Alert and oriented, thought

## 2020-02-14 NOTE — PLAN OF CARE
ADVANCED CARE PLANNING    Name:You Castillo       :  1932              MRN:  [id number]  Admission Date: 2020  3:34 PM  Date of Discussion:  2020      Purpose of Encounter: Advanced care planning in light of CHF. Parties in attendance: :Viji Guidry MD, . Decisional Capacity:Yes      Objective/Medical Story: 81 yo male admitted for acute on chronic CHF actively being diuresed presented w/ related SOB. Hx complicated by known CAD/s/p AVR. Active Diagnoses: Active Hospital Problems    Diagnosis Date Noted    CHF (congestive heart failure) ([facility]) [I50.9] 2020    S/P AVR (aortic valve replacement) [Z95.2] 2019    COPD (chronic obstructive pulmonary disease) ([facility]) [J44.9] 2019    Hyperlipidemia [E78.5]     CAD (coronary artery disease) [I25.10]          Subjective/Patient Story: Patient understands that his/her cardiopulmonary function continues to deteriorate. Patient no longer wishes further curative interventions, including hospitalization, if there is no long term benefit :No  Patient wishes to continue further interventions, patient will re-evaluate at a later time: Yes    Goals of Care Determinations: Patient wishes to focus on recovery. Code Status: At this time patient wishes to be Full Code      Time Spent:     Total time spent face-to-face in education and discussion: 20 minutes. Electronically signed by Sara Bailey MD on 2020 at 4:05 PM    Thank you Gregoria Holland MD for the opportunity to be involved in this patient's care. If you have any questions or concerns please feel free to contact me at [phone].

## 2020-02-14 NOTE — PLAN OF CARE
Problem: OXYGENATION/RESPIRATORY FUNCTION  Goal: Patient will achieve/maintain normal respiratory rate/effort  Description  Respiratory rate and effort will be within normal limits for the patient  Outcome: Ongoing  Note:     Patient's EF (Ejection Fraction) is greater than 40%    Heart Failure Medications:  Diuretics[de-identified] Furosemide    (One of the following REQUIRED for EF <40%/SYSTOLIC FAILURE but MAY be used in EF% >40%/DIASTOLIC FAILURE)        ACE[de-identified] None        ARB[de-identified] None         ARNI[de-identified] None    (Beta Blockers)  NON- Evidenced Based Beta Blocker (for EF% >40%/DIASTOLIC FAILURE): Metoprolol TARTrate- Lopressor    Evidenced Based Beta Blocker::(REQUIRED for EF% <40%/SYSTOLIC FAILURE) None  . .................................................................................................................................................. Patient's weights and intake/output reviewed: Yes    Patient's Last Weight: 164 lbs obtained by standing scale. Difference of 2 lbs less than last documented weight. Intake/Output Summary (Last 24 hours) at 2/14/2020 1310  Last data filed at 2/14/2020 1144  Gross per 24 hour   Intake 960 ml   Output 2455 ml   Net -1495 ml       Comorbidities Reviewed Yes    Patient has a past medical history of Abnormal stress test, CAD (coronary artery disease), GERD (gastroesophageal reflux disease), Hx of blood clots, and Hyperlipidemia. >>For CHF and Comorbidity documentation on Education Time and Topics, please see Education Tab    Progressive Mobility Assessment:  What is this patient's Current Level of Mobility?: Ambulatory- with Assistance  How was this patient Mobilized today?: Up to Chair and Up in Room                 With Whom? Nurse                 Level of Difficulty/Assistance: 1x Assist     Pt up in chair at this time on 3 L O2. Pt with complaints of shortness of breath. Pt without lower extremity edema.      Patient and/or Family's stated Goal of Care this Admission: reduce shortness of breath and be more comfortable prior to discharge        :

## 2020-02-14 NOTE — CONSULTS
Yury Perdomo [address] 12/25/1932    History:  Past Medical History:   Diagnosis Date    Abnormal stress test 10/30/2013    CAD (coronary artery disease)     GERD (gastroesophageal reflux disease)     Hx of blood clots     DVT    Hyperlipidemia        ECHO:55%    Discharge plans: he is moving to [place] to live in an assisted living to be closer to his son.  for 11 years    Family Present: none    Advanced Directives: patient has advance directives scanned in the chart    Patient's stated goal of care: reduce symptoms. Avoids adding salt to foods, now aware to avoid high sodium content foods. Does not over drink fluids. Has scale, agreeable to perform daily weights. Willingness to change 4/5      Patient's current functional capacity:  Marked limitation of physical activity. Comfortable at rest. Less than ordinary activity causes fatigue, palpitation, or dyspnea. Pt sitting in bed at this time on 3 L O2. Pt with complaints of shortness of breath. Pt with pitting lower extremity edema. Patient's weights and intake/output reviewed:         Last three weights hospital weights reviewed:    Patient Vitals for the past 96 hrs (Last 3 readings):   Weight   02/14/20 0409 164 lb 12.8 oz (74.8 kg)   02/13/20 0600 166 lb 3.2 oz (75.4 kg)   02/12/20 2005 168 lb 3.2 oz (76.3 kg)       Patient provided with both written and verbal education on CHF signs/symptoms, causes, discharge medications, daily weights, low sodium diet, activity, and follow-up. HF zone self management written instructions provided/reviewed and advised to call MD when in \"yellow\" zone. Instructed them to call the doctor post discharge if they experiences increasing worsening shortness of breath, worsening chest pains, increased swelling from their baseline, worsening cough, or weight gain of >2- 3 lbs in a day/ 5 lb gain in a week.   Also advised to call the doctor if they feels dizzy, increased fatigue, decreased or difficulty urinating. Instructed on and emphasized importance of scheduled hospital follow-up appointment with Eben Edmondson MD on 02- at 1:20pm.       Maegan Gresham verbalized understanding. No additional questions at this time. Stated he will alert nurse with any questions. PATIENT/CAREGIVER TEACHING:    Level of patient/caregiver understanding able to:   [ X ] Verbalize understanding [ ] Demonstrate understanding [ ] Teach back   [ X ] Needs reinforcement [ ] Other:     Education Time: 15 minutes    Recommendations:   1. Encourage follow-up appointment compliance. Next appointment: 02-  2. Educate further on fluid restriction of <64oz during inpatient stay so they can understand how to measure intake at home. 3. Review sodium restrictions. Encouraged to not add table salt to their foods and avoid foods that are high in sodium. 4. Continue to educate on S/S. Stress the importance of calling the MD with the earliest signs of an acute exacerbation. 5. Emphasize daily weights - instructed to call the MD if the patient gains 3 lb in a day or 5 lb in a week. 6. Provided patient with CHF Resource Line for questions and concerns. 7. Establish with MD when he moves to [address] [place] [address].  [facility] - [facility] BSN-RN  Heart Failure Navigator  [address] [place] [address]  [phone]

## 2020-02-14 NOTE — CARE COORDINATION
RENNY notes that CM is following to assist with potential discharge planning needs. SW notes that pt's son is arriving to [facility] to assist pt with any plans that he may have. Pt is active with home 2 with Patient [employer], and has a Cpap machine at home. SW will follow for potential discharge planning needs that may arise.

## 2020-02-15 LAB
ANION GAP SERPL CALCULATED.3IONS-SCNC: 10 MMOL/L (ref 3–16)
BUN BLDV-MCNC: 33 MG/DL (ref 7–20)
CALCIUM SERPL-MCNC: 9.3 MG/DL (ref 8.3–10.6)
CHLORIDE BLD-SCNC: 88 MMOL/L (ref 99–110)
CO2: 39 MMOL/L (ref 21–32)
CREAT SERPL-MCNC: 1 MG/DL (ref 0.8–1.3)
GFR AFRICAN AMERICAN: >60
GFR NON-AFRICAN AMERICAN: >60
GLUCOSE BLD-MCNC: 98 MG/DL (ref 70–99)
MAGNESIUM: 2.2 MG/DL (ref 1.8–2.4)
OCCULT BLOOD DIAGNOSTIC: NORMAL
POTASSIUM SERPL-SCNC: 3.7 MMOL/L (ref 3.5–5.1)
PRO-BNP: 2274 PG/ML (ref 0–449)
SODIUM BLD-SCNC: 137 MMOL/L (ref 136–145)

## 2020-02-15 PROCEDURE — 2700000000 HC OXYGEN THERAPY PER DAY

## 2020-02-15 PROCEDURE — 6360000002 HC RX W HCPCS: Performed by: INTERNAL MEDICINE

## 2020-02-15 PROCEDURE — G0328 FECAL BLOOD SCRN IMMUNOASSAY: HCPCS

## 2020-02-15 PROCEDURE — 97166 OT EVAL MOD COMPLEX 45 MIN: CPT

## 2020-02-15 PROCEDURE — 97535 SELF CARE MNGMENT TRAINING: CPT

## 2020-02-15 PROCEDURE — 1200000000 HC SEMI PRIVATE

## 2020-02-15 PROCEDURE — 80048 BASIC METABOLIC PNL TOTAL CA: CPT

## 2020-02-15 PROCEDURE — 94660 CPAP INITIATION&MGMT: CPT

## 2020-02-15 PROCEDURE — 83735 ASSAY OF MAGNESIUM: CPT

## 2020-02-15 PROCEDURE — 6370000000 HC RX 637 (ALT 250 FOR IP): Performed by: NURSE PRACTITIONER

## 2020-02-15 PROCEDURE — 2580000003 HC RX 258: Performed by: INTERNAL MEDICINE

## 2020-02-15 PROCEDURE — 6370000000 HC RX 637 (ALT 250 FOR IP): Performed by: INTERNAL MEDICINE

## 2020-02-15 PROCEDURE — 94761 N-INVAS EAR/PLS OXIMETRY MLT: CPT

## 2020-02-15 PROCEDURE — 83880 ASSAY OF NATRIURETIC PEPTIDE: CPT

## 2020-02-15 PROCEDURE — 36415 COLL VENOUS BLD VENIPUNCTURE: CPT

## 2020-02-15 RX ADMIN — FUROSEMIDE 40 MG: 10 INJECTION, SOLUTION INTRAMUSCULAR; INTRAVENOUS at 17:59

## 2020-02-15 RX ADMIN — METOPROLOL TARTRATE 25 MG: 25 TABLET ORAL at 21:48

## 2020-02-15 RX ADMIN — Medication 10 ML: at 08:57

## 2020-02-15 RX ADMIN — Medication 1 TABLET: at 08:56

## 2020-02-15 RX ADMIN — PANTOPRAZOLE SODIUM 40 MG: 40 TABLET, DELAYED RELEASE ORAL at 08:01

## 2020-02-15 RX ADMIN — ENOXAPARIN SODIUM 40 MG: 40 INJECTION SUBCUTANEOUS at 08:56

## 2020-02-15 RX ADMIN — METOPROLOL TARTRATE 25 MG: 25 TABLET ORAL at 08:56

## 2020-02-15 RX ADMIN — ASPIRIN 81 MG: 81 TABLET, COATED ORAL at 08:56

## 2020-02-15 RX ADMIN — Medication 10 ML: at 21:48

## 2020-02-15 RX ADMIN — Medication 324 MG: at 08:56

## 2020-02-15 RX ADMIN — SIMVASTATIN 40 MG: 40 TABLET, FILM COATED ORAL at 21:48

## 2020-02-15 RX ADMIN — FUROSEMIDE 40 MG: 10 INJECTION, SOLUTION INTRAMUSCULAR; INTRAVENOUS at 08:56

## 2020-02-15 RX ADMIN — MELATONIN TAB 5 MG 5 MG: 5 TAB at 21:48

## 2020-02-15 ASSESSMENT — PAIN SCALES - GENERAL
PAINLEVEL_OUTOF10: 0

## 2020-02-15 NOTE — PROGRESS NOTES
Occupational Therapy   Occupational Therapy Initial Assessment  Date: 2/15/2020   Patient Name: Esteban Alvarez  MRN: [id number]     : 1932    Date of Service: 2/15/2020    Discharge Recommendations:  [address]  OT Equipment Recommendations  Equipment Needed: No  Other: defer to next level of care     Assessment      Pt is able to complete bed mobility supine to EOB and EOB to supine with supervision, with HOB elevated and cues for safety. Completes functional transfers using RW while on 3L of O2 cont. Pt requires CGA during sit <> stands, toilet transfers, and toilet routine. Able to stand up to 1-2 min at sink side with CGA and Unilateral UE support during grooming task d/t impaired standing tolerance and balance. Fatigues quickly and somewhat impulsive with navigating sharp turns and sit <> stands with RW. Pt require cont skilled OT services in order to return to PLOF safely and indep as possible. Performance deficits / Impairments: Decreased functional mobility ; Decreased strength;Decreased endurance;Decreased coordination;Decreased ADL status; Decreased safe awareness;Decreased high-level IADLs;Decreased balance  Prognosis: Good  Decision Making: Medium Complexity  OT Education: OT Role;ADL Adaptive Strategies; Plan of Care;Transfer Training;Energy Conservation;Equipment;Precautions  REQUIRES OT FOLLOW UP: Yes  Activity Tolerance  Activity Tolerance: Patient limited by fatigue;Patient Tolerated treatment well  Activity Tolerance: on #3 L of O2 cont (reports wearing 3L of O2 cont prior to admission ~6 months per pt report); O2 sats at 95 with exertion   Safety Devices  Safety Devices in place: Yes  Type of devices: Nurse notified;Gait belt;Bed alarm in place; Left in bed;Call light within reach           Patient Diagnosis(es): The primary encounter diagnosis was Respiratory failure with hypoxia, unspecified chronicity ([facility] [address]  A diagnosis of Acute on chronic congestive heart failure, unspecified heart failure type [facility]) was also pertinent to this visit. has a past medical history of Abnormal stress test, CAD (coronary artery disease), GERD (gastroesophageal reflux disease), Hx of blood clots, and Hyperlipidemia. has a past surgical history that includes Pacemaker insertion; Carotid endarterectomy (Bilateral); Mitral valve replacement; Coronary artery bypass graft; Colonoscopy; Colonoscopy (5/13/2014); laparoscopy (11/23/2014); hernia repair; Cataract removal with implant (Right, 08/24/2016); and Cataract removal with implant (Left, 09/14/2016).            Restrictions  Restrictions/Precautions  Restrictions/Precautions: General Precautions, Fall Risk  Position Activity Restriction  Other position/activity restrictions: 3 L O2 cont; BIPAP     Subjective   General  Chart Reviewed: Yes  Family / Caregiver Present: No  Referring Practitioner: Julia Pisano MD  Diagnosis: CHF  Subjective  Subjective: Pt found awake and alert watching tv while resting at bedside upon arrival; Pleasant and agreeable to OT eval   General Comment  Comments: RN approved   Patient Currently in Pain: Denies  Vital Signs  Patient Currently in Pain: Denies  Social/Functional History  Social/Functional History  Lives With: Alone  Type of Home: House  Home Layout: Two level, Able to Live on Main level with bedroom/bathroom, Performs ADL's on one level  Home Access: Stairs to enter without rails  Entrance Stairs - Number of Steps: 2 BRYSON (from garage)  Bathroom Shower/Tub: Walk-in shower, Tub/Shower unit(avoids using tub/shower (reports difficulty getting in/out of) )  Bathroom Toilet: Standard  Bathroom Equipment: (reports no DME)  Home Equipment: Cane  ADL Assistance: Independent(reports recent increase difficulty)  Homemaking Assistance: Independent(reports recent increase difficulty)  Ambulation Assistance: Independent(with SPC )  Transfer Assistance: Independent(with SPC)  Active : Yes  Occupation: Retired  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   Leisure & Hobbies: watch basketball; favorite (Jesse Standing)        Objective   Vision: Impaired  Vision Exceptions: Wears glasses at all times  Hearing: Exceptions to [facility]  Hearing Exceptions: Hard of hearing/hearing concerns    Orientation  Overall Orientation Status: Within Functional Limits     Standing Balance  Time: 1-2 min for grooming tasks, CGA  Functional Mobility  Functional - Mobility Device: Rolling Walker  Activity: To/from bathroom  Assist Level: Contact guard assistance  Functional Mobility Comments: cues for safety and pacing; assist to manage cannula line   ADL  Grooming: Contact guard assistance  Toileting: Contact guard assistance(without briefs/undewear - denied need)  Tone RUE  RUE Tone: Normotonic  Tone LUE  LUE Tone: Normotonic  Coordination  Movements Are Fluid And Coordinated: Yes     Bed mobility  Bridging: Stand by assistance  Rolling to Left: Supervision  Rolling to Right: Supervision  Supine to Sit: Supervision  Sit to Supine: Supervision  Scooting: Supervision  Transfers  Sit to stand: Contact guard assistance  Stand to sit: Contact guard assistance  Transfer Comments: cues for safety with hand placement of RW      Cognition  Overall Cognitive Status: Exceptions  Arousal/Alertness: Appropriate responses to stimuli  Following Commands:  Follows one step commands with repetition  Problem Solving: Assistance required to generate solutions  Insights: Decreased awareness of deficits  Initiation: Requires cues for some        Sensation  Overall Sensation Status: WFL(denies any deficits )        LUE AROM (degrees)  LUE AROM : WFL  RUE AROM (degrees)  RUE AROM : WFL  LUE Strength  Gross LUE Strength: WFL  RUE Strength  Gross RUE Strength: WFL        Plan   Plan  Times per week: 2-5x/ wk   Times per day: Daily  Current Treatment Recommendations: Strengthening, Endurance Training, Patient/Caregiver Education & Training,

## 2020-02-15 NOTE — PLAN OF CARE
Problem: Falls - Risk of:  Goal: Will remain free from falls  Description  Will remain free from falls  Outcome: Ongoing     Problem: OXYGENATION/RESPIRATORY FUNCTION  Goal: Patient will achieve/maintain normal respiratory rate/effort  Description  Respiratory rate and effort will be within normal limits for the patient  2/14/2020 2316 by Juaquin Kaufman RN  Outcome: Ongoing     Patient's EF (Ejection Fraction) is greater than 40%    Heart Failure Medications:  Diuretics[de-identified] Furosemide    (One of the following REQUIRED for EF <40%/SYSTOLIC FAILURE but MAY be used in EF% >40%/DIASTOLIC FAILURE)        ACE[de-identified] None        ARB[de-identified] None         ARNI[de-identified] None    (Beta Blockers)  NON- Evidenced Based Beta Blocker (for EF% >40%/DIASTOLIC FAILURE): Metoprolol TARTrate- Lopressor    Evidenced Based Beta Blocker::(REQUIRED for EF% <40%/SYSTOLIC FAILURE) None  . .................................................................................................................................................. Patient's weights and intake/output reviewed: Yes    Patient's Last Weight: 164 lbs obtained by standing scale. Difference of 2 lbs less than last documented weight. Intake/Output Summary (Last 24 hours) at 2/14/2020 2317  Last data filed at 2/14/2020 2119  Gross per 24 hour   Intake 1080 ml   Output 1880 ml   Net -800 ml       Comorbidities Reviewed Yes    Patient has a past medical history of Abnormal stress test, CAD (coronary artery disease), GERD (gastroesophageal reflux disease), Hx of blood clots, and Hyperlipidemia. >>For CHF and Comorbidity documentation on Education Time and Topics, please see Education Tab    Progressive Mobility Assessment:  What is this patient's Current Level of Mobility?: Ambulatory- with Assistance  How was this patient Mobilized today?: Up to Chair,  Up to Toilet/Shower, and Up in Room                 With Whom?  Nurse and PCA                 Level of Difficulty/Assistance: 1x Assist     Pt resting in bed at this time on3 L O2. Pt with complaints of shortness of breath. Pt with pitting lower extremity edema.      Patient and/or Family's stated Goal of Care this Admission: reduce shortness of breath, increase activity tolerance, be more comfortable, and reduce lower extremity edema prior to discharge        :

## 2020-02-15 NOTE — PROGRESS NOTES
Patient transferred from A1 to A2 219. CMU aware. Patient oriented to room and call system. Has stage 2 on sacrum, right buttock cheek. Mepilex applied to sacrum.  DEVONEI

## 2020-02-15 NOTE — PROGRESS NOTES
Hospitalist Progress Note      PCP: Mine Moore MD    Date of Admission: 2/12/2020    Chief Complaint:  [facility] Course:     Subjective: He is sitting up in bed, looks little weak and tired. His son is at the bedside. His appetite seems to be improving, he states his shortness of breath is also improving. Denies any chest pain. Discussed with son at the bedside, all questions answered. Does have some concern about reports of blood in his stool. We will check stool for occult blood at this time. Follow H/H. No reports of any bleeding during this admission. Medications:  Reviewed    Infusion Medications   Scheduled Medications    aspirin  81 mg Oral Daily    pantoprazole  40 mg Oral QAM AC    metoprolol tartrate  25 mg Oral BID    simvastatin  40 mg Oral Nightly    sodium chloride flush  10 mL Intravenous 2 times per day    enoxaparin  40 mg Subcutaneous Daily    furosemide  40 mg Intravenous BID    ferrous gluconate  324 mg Oral Daily with breakfast    therapeutic multivitamin-minerals  1 tablet Oral Daily     PRN Meds: Lip Balm, melatonin, sodium chloride flush, magnesium hydroxide, ondansetron, acetaminophen, ipratropium-albuterol      Intake/Output Summary (Last 24 hours) at 2/15/2020 1226  Last data filed at 2/15/2020 0849  Gross per 24 hour   Intake 1080 ml   Output 1225 ml   Net -145 ml       Physical Exam Performed:    /62   Pulse 97   Temp 98 °F (36.7 °C) (Oral)   Resp 15   Ht 5' 7\" (1.702 m)   Wt 162 lb 12.8 oz (73.8 kg)   SpO2 94%   BMI 25.50 kg/m²     General appearance: Older male sitting up in bed, looks weak and tired, is alert and cooperative. HEENT: Pupils equal, round, and reactive to light. Conjunctivae/corneas clear. Neck: Supple, with full range of motion. No jugular venous distention. Trachea midline. Respiratory:  Normal respiratory effort. Clear to auscultation, bilaterally without Rales/Wheezes/Rhonchi.   Cardiovascular: Regular rate and rhythm with normal S1/S2   Abdomen: Soft, non-tender, non-distended with + bowel sounds. Musculoskeletal: No clubbing, cyanosis , + 1 lower edema bilaterally. Neurologic:  Neurovascularly intact without any focal sensory/motor deficits. Cranial nerves: II-XII intact, grossly non-focal.  Psychiatric: Alert and oriented, thought content appropriate, normal insight  Capillary Refill: Brisk,< 3 seconds   Peripheral Pulses: +2 palpable, equal bilaterally       Labs:   Recent Labs     02/12/20  1555   WBC 9.4   HGB 12.7*   HCT 37.9*        Recent Labs     02/13/20  0903 02/14/20  0729 02/15/20  0811    134* 137   K 4.3 3.9 3.7   CL 88* 86* 88*   CO2 39* 41* 39*   BUN 27* 32* 33*   CREATININE 1.1 1.2 1.0   CALCIUM 9.5 9.3 9.3     Recent Labs     02/12/20  1555   AST 39*   ALT 24   BILITOT 0.6   ALKPHOS 176*     No results for input(s): INR in the last 72 hours. Recent Labs     02/12/20  1555   TROPONINI 0.01       Urinalysis:      Lab Results   Component Value Date    NITRU Negative 11/19/2014    BLOODU SMALL 11/19/2014    SPECGRAV >=1.030 11/19/2014    GLUCOSEU Negative 11/19/2014       Radiology:  XR CHEST STANDARD (2 VW)   Final Result   Progressing edema. Bilateral pleural effusions. New indeterminate nodules bilaterally. Recommend CT correlation with IV   contrast.                 Assessment/Plan:    Active Hospital Problems    Diagnosis    CHF (congestive heart failure) ([facility]) [I50.9]    S/P AVR (aortic valve replacement) [Z95.2]    COPD (chronic obstructive pulmonary disease) ([facility]) [J44.9]    Hyperlipidemia [E78.5]    CAD (coronary artery disease) [I25.10]     CHF - acute on chronic diastolic failure w/ preserved EF 55-60% by Echo Nov 2019 w/ grade 2 diastolic dysfunction. Continue current medical management and follow I/O as well as clinical response. CXR w/ pulmonary edema and BNP elevated on admission.   Continue diuresis, follow volume status.     HTN/CAD - w/ known CAD s/p prior

## 2020-02-15 NOTE — PROGRESS NOTES
02/15/20 0345   NIV Type   Skin Protection for O2 Device Yes   Equipment Type V60   Mode Bilevel   Mask Type Full face mask   Mask Size Medium   Settings/Measurements   IPAP 10 cmH20   CPAP/EPAP 5 cmH2O   Resp 26   FiO2  35 %   Vt Exhaled 408 mL   Minute Volume 10 Liters   Mask Leak (lpm) 16 lpm   Comfort Level Good   Using Accessory Muscles No   SpO2 96

## 2020-02-16 LAB
ANION GAP SERPL CALCULATED.3IONS-SCNC: 11 MMOL/L (ref 3–16)
BUN BLDV-MCNC: 33 MG/DL (ref 7–20)
CALCIUM SERPL-MCNC: 9.5 MG/DL (ref 8.3–10.6)
CHLORIDE BLD-SCNC: 91 MMOL/L (ref 99–110)
CO2: 38 MMOL/L (ref 21–32)
CREAT SERPL-MCNC: 1 MG/DL (ref 0.8–1.3)
GFR AFRICAN AMERICAN: >60
GFR NON-AFRICAN AMERICAN: >60
GLUCOSE BLD-MCNC: 106 MG/DL (ref 70–99)
HCT VFR BLD CALC: 38.4 % (ref 40.5–52.5)
HEMOGLOBIN: 12.9 G/DL (ref 13.5–17.5)
MAGNESIUM: 2.2 MG/DL (ref 1.8–2.4)
MCH RBC QN AUTO: 32.7 PG (ref 26–34)
MCHC RBC AUTO-ENTMCNC: 33.6 G/DL (ref 31–36)
MCV RBC AUTO: 97.3 FL (ref 80–100)
PDW BLD-RTO: 14.1 % (ref 12.4–15.4)
PLATELET # BLD: 176 K/UL (ref 135–450)
PMV BLD AUTO: 7.8 FL (ref 5–10.5)
POTASSIUM SERPL-SCNC: 3.8 MMOL/L (ref 3.5–5.1)
RBC # BLD: 3.94 M/UL (ref 4.2–5.9)
SODIUM BLD-SCNC: 140 MMOL/L (ref 136–145)
WBC # BLD: 9.9 K/UL (ref 4–11)

## 2020-02-16 PROCEDURE — 2580000003 HC RX 258: Performed by: INTERNAL MEDICINE

## 2020-02-16 PROCEDURE — 97162 PT EVAL MOD COMPLEX 30 MIN: CPT

## 2020-02-16 PROCEDURE — 1200000000 HC SEMI PRIVATE

## 2020-02-16 PROCEDURE — 6370000000 HC RX 637 (ALT 250 FOR IP): Performed by: INTERNAL MEDICINE

## 2020-02-16 PROCEDURE — 83735 ASSAY OF MAGNESIUM: CPT

## 2020-02-16 PROCEDURE — 94660 CPAP INITIATION&MGMT: CPT

## 2020-02-16 PROCEDURE — 97116 GAIT TRAINING THERAPY: CPT

## 2020-02-16 PROCEDURE — 94761 N-INVAS EAR/PLS OXIMETRY MLT: CPT

## 2020-02-16 PROCEDURE — 80048 BASIC METABOLIC PNL TOTAL CA: CPT

## 2020-02-16 PROCEDURE — 6360000002 HC RX W HCPCS: Performed by: INTERNAL MEDICINE

## 2020-02-16 PROCEDURE — 85027 COMPLETE CBC AUTOMATED: CPT

## 2020-02-16 PROCEDURE — 6370000000 HC RX 637 (ALT 250 FOR IP): Performed by: NURSE PRACTITIONER

## 2020-02-16 PROCEDURE — 2700000000 HC OXYGEN THERAPY PER DAY

## 2020-02-16 RX ADMIN — MELATONIN TAB 5 MG 5 MG: 5 TAB at 20:54

## 2020-02-16 RX ADMIN — FUROSEMIDE 40 MG: 10 INJECTION, SOLUTION INTRAMUSCULAR; INTRAVENOUS at 18:36

## 2020-02-16 RX ADMIN — ASPIRIN 81 MG: 81 TABLET, COATED ORAL at 09:06

## 2020-02-16 RX ADMIN — METOPROLOL TARTRATE 25 MG: 25 TABLET ORAL at 09:06

## 2020-02-16 RX ADMIN — Medication 324 MG: at 09:08

## 2020-02-16 RX ADMIN — FUROSEMIDE 40 MG: 10 INJECTION, SOLUTION INTRAMUSCULAR; INTRAVENOUS at 09:06

## 2020-02-16 RX ADMIN — METOPROLOL TARTRATE 25 MG: 25 TABLET ORAL at 20:54

## 2020-02-16 RX ADMIN — SIMVASTATIN 40 MG: 40 TABLET, FILM COATED ORAL at 20:54

## 2020-02-16 RX ADMIN — Medication 1 TABLET: at 09:06

## 2020-02-16 RX ADMIN — Medication 10 ML: at 09:06

## 2020-02-16 RX ADMIN — Medication 10 ML: at 20:54

## 2020-02-16 RX ADMIN — ENOXAPARIN SODIUM 40 MG: 40 INJECTION SUBCUTANEOUS at 09:06

## 2020-02-16 RX ADMIN — PANTOPRAZOLE SODIUM 40 MG: 40 TABLET, DELAYED RELEASE ORAL at 06:06

## 2020-02-16 ASSESSMENT — PAIN SCALES - GENERAL
PAINLEVEL_OUTOF10: 0

## 2020-02-16 NOTE — PROGRESS NOTES
Physical Therapy    Facility/Department: [facility] A2 CARD TELEMETRY  Initial Assessment    NAME: Cy Mahan  : 1932  MRN: [id number]    Date of Service: 2020    Discharge Recommendations:  Subacute/Skilled Nursing Facility   PT Equipment Recommendations  Equipment Needed: No(defer)    Assessment   Body structures, Functions, Activity limitations: Decreased functional mobility ; Decreased safe awareness;Decreased balance;Decreased ADL status; Decreased endurance;Decreased strength  Assessment: Pt evaluated this AM for gait training. Pt cleared per RN, pt agreeable to therapy. Pt admitted for acute on chronic CHF. Pt ambulated 100 ft with SPC and CGA-min A for 1 LOB. Pt requires cues for proper use of cane. SPO2 85% following ambulation, returned to 91% within 2 mins rest on 3 L O2. Pt completed second bout of ambulation with RW and SBA with improved balance but continues to require cues for slow navigation on turns and keeping walker close to him for safety. PT will continue to follow throughout LOS. Recommending d/c to SNF d/t decreased activity tolerance, endurance, and decreased balance placing pt at increased risk for falls. Treatment Diagnosis: decreased endurance and balance   Specific instructions for Next Treatment: progress ambulation   Prognosis: Good  Decision Making: Medium Complexity  PT Education: Goals;Precautions;Orientation; Functional Mobility Training;PT Role;Transfer Training;Plan of Care;Gait Training;General Safety  Patient Education: pt verbalized understanding  Barriers to Learning: none  REQUIRES PT FOLLOW UP: Yes  Activity Tolerance  Activity Tolerance: Patient limited by endurance; Patient limited by fatigue  Activity Tolerance: SPO2 85% following ambulation on 3L O2       Patient Diagnosis(es): The primary encounter diagnosis was Respiratory failure with hypoxia, unspecified chronicity ([facility] [address]  A diagnosis of Acute on chronic congestive heart failure, unspecified heart failure type [facility]) was also pertinent to this visit. has a past medical history of Abnormal stress test, CAD (coronary artery disease), GERD (gastroesophageal reflux disease), Hx of blood clots, and Hyperlipidemia. has a past surgical history that includes Pacemaker insertion; Carotid endarterectomy (Bilateral); Mitral valve replacement; Coronary artery bypass graft; Colonoscopy; Colonoscopy (5/13/2014); laparoscopy (11/23/2014); hernia repair; Cataract removal with implant (Right, 08/24/2016); and Cataract removal with implant (Left, 09/14/2016).     Restrictions  Restrictions/Precautions  Restrictions/Precautions: General Precautions, Fall Risk, Up as Tolerated  Position Activity Restriction  Other position/activity restrictions: 3 L O2 cont; BIPAP, telemetry  Vision/Hearing  Vision: Impaired  Vision Exceptions: Wears glasses at all times  Hearing: Exceptions to [facility]  Hearing Exceptions: Hard of hearing/hearing concerns     Subjective  General  Chart Reviewed: Yes  Patient assessed for rehabilitation services?: Yes  Additional Pertinent Hx: CHF  Response To Previous Treatment: Not applicable  Family / Caregiver Present: No  Referring Practitioner: Mike Pearl  Referral Date : 02/15/20  Diagnosis: acute on chronic CHF  Follows Commands: Within Functional Limits  Subjective  Subjective: pt in bed, agreeable to therapy  Pain Screening  Patient Currently in Pain: Denies  Vital Signs  Patient Currently in Pain: Denies  Oxygen Therapy  SpO2: (85% following ambulating, 91% within 2 mins rest)  Pulse Oximeter Device Mode: Intermittent  Pulse Oximeter Device Location: Finger  O2 Device: Nasal cannula  O2 Flow Rate (L/min): 3 L/min       Orientation  Orientation  Overall Orientation Status: Within Normal Limits  Social/Functional History  Social/Functional History  Lives With: Alone  Type of Home: House  Home Layout: Two level, Able to Live on Main level with bedroom/bathroom, Performs ADL's on one level  Home Access: Stairs to enter without rails  Entrance Stairs - Number of Steps: 2 BRYSON (from garage)  Bathroom Shower/Tub: Walk-in shower, Tub/Shower unit  Bathroom Toilet: Standard  Bathroom Equipment: (reports no DME)  Home Equipment: Cane  ADL Assistance: Independent  Homemaking Assistance: Independent(has cleaning lady)  Homemaking Responsibilities: Yes  Ambulation Assistance: Independent  Transfer Assistance: Independent  Active : Yes  Occupation: Retired  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   Leisure & Hobbies: watch basketball; favorite ([address])   Additional Comments: denies falls, reports he is considering moving into an assisted living facility in [place] near his son, son able to provide 24/7 upon d/c  Cognition   Cognition  Overall Cognitive Status: Exceptions  Arousal/Alertness: Appropriate responses to stimuli  Following Commands:  Follows one step commands with repetition  Problem Solving: Assistance required to generate solutions;Assistance required to identify errors made;Assistance required to correct errors made  Insights: Decreased awareness of deficits  Initiation: Requires cues for some  Sequencing: Requires cues for some    Objective     Observation/Palpation  Posture: Good    AROM RLE (degrees)  RLE AROM: WFL(did not formally assess)  AROM LLE (degrees)  LLE AROM : WFL(did not formally assess)  Strength RLE  Strength RLE: WFL(did not formally assess)  Strength LLE  Strength LLE: WFL(did not formally assess)     Sensation  Overall Sensation Status: WFL(denies N/T)  Bed mobility  Supine to Sit: Supervision  Sit to Supine: Unable to assess(up in chair end of session)  Transfers  Sit to Stand: Contact guard assistance(with SPC and RW)  Stand to sit: Contact guard assistance(with SPC and RW)  Ambulation  Ambulation?: Yes  Ambulation 1  Surface: level tile  Device: Rolling Walker;Single point cane  Assistance: Minimal assistance;Stand by assistance  Quality of Gait: pt ambulates with

## 2020-02-16 NOTE — PLAN OF CARE
increase activity tolerance, better understand heart failure and disease management, be more comfortable, and reduce lower extremity edema prior to discharge        :

## 2020-02-16 NOTE — PROGRESS NOTES
Hospitalist Progress Note      PCP: Sravani Manzo MD    Date of Admission: 2/12/2020    Chief Complaint: [facility] Course:     Subjective:     He is sitting up in bed, looks little weak and tired. His appetite seems to be improving, he states his shortness of breath is also improving. Denies any chest pain.     Discussed with son at the bedside, all questions answered. Does have some concern about reports of blood in his stool. Did check stool for occult blood and it was negative for occult blood. No reports of any bleeding during this admission      Medications:  Reviewed    Infusion Medications   Scheduled Medications    aspirin  81 mg Oral Daily    pantoprazole  40 mg Oral QAM AC    metoprolol tartrate  25 mg Oral BID    simvastatin  40 mg Oral Nightly    sodium chloride flush  10 mL Intravenous 2 times per day    enoxaparin  40 mg Subcutaneous Daily    furosemide  40 mg Intravenous BID    ferrous gluconate  324 mg Oral Daily with breakfast    therapeutic multivitamin-minerals  1 tablet Oral Daily     PRN Meds: Lip Balm, melatonin, sodium chloride flush, magnesium hydroxide, ondansetron, acetaminophen, ipratropium-albuterol      Intake/Output Summary (Last 24 hours) at 2/16/2020 1401  Last data filed at 2/16/2020 1359  Gross per 24 hour   Intake 1440 ml   Output 1750 ml   Net -310 ml       Physical Exam Performed:    BP (!) 96/57   Pulse 102   Temp 97.9 °F (36.6 °C) (Oral)   Resp 14   Ht 5' 7\" (1.702 m)   Wt 160 lb (72.6 kg)   SpO2 96%   BMI 25.06 kg/m²     General appearance: Older male sitting up in chair, looks weak and tired, is alert and cooperative. HEENT: Pupils equal, round, and reactive to light. Conjunctivae/corneas clear. Neck: Supple, with full range of motion. No jugular venous distention. Trachea midline. Respiratory:  Normal respiratory effort. Clear to auscultation, bilaterally without Rales/Wheezes/Rhonchi.   Cardiovascular: Regular rate and rhythm with normal

## 2020-02-16 NOTE — PLAN OF CARE
Patient's EF (Ejection Fraction) is greater than 40%    Heart Failure Medications:   Diuretics[de-identified] Furosemide     (One of the following REQUIRED for EF <40%/SYSTOLIC FAILURE but MAY be used in EF% >40%/DIASTOLIC FAILURE)        ACE[de-identified] None        ARB[de-identified] None         ARNI[de-identified] None    (Beta Blockers)   NON- Evidenced Based Beta Blocker (for EF% >40%/DIASTOLIC FAILURE): Metoprolol TARTrate- Lopressor     Evidenced Based Beta Blocker::(REQUIRED for EF% <40%/SYSTOLIC FAILURE) None  . .................................................................................................................................................. Patient's weights and intake/output reviewed: Yes    Patient's Last Weight: 160 lbs obtained by standing scale. Difference of 2 lbs less than last documented weight. Intake/Output Summary (Last 24 hours) at 2/16/2020 1223  Last data filed at 2/16/2020 1024  Gross per 24 hour   Intake 1200 ml   Output 1600 ml   Net -400 ml       Comorbidities Reviewed Yes    Patient has a past medical history of Abnormal stress test, CAD (coronary artery disease), GERD (gastroesophageal reflux disease), Hx of blood clots, and Hyperlipidemia. >>For CHF and Comorbidity documentation on Education Time and Topics, please see Education Tab    Progressive Mobility Assessment:  What is this patient's Current Level of Mobility?: Ambulatory- with Assistance  How was this patient Mobilized today?: Up to Chair                 With Whom? Nurse, PCA, PT, OT and Self                 Level of Difficulty/Assistance: 1x Assist     Pt up in chair at this time on 3 L O2. Pt with complaints of shortness of breath. Pt with pitting lower extremity edema.      Patient and/or Family's stated Goal of Care this Admission: reduce shortness of breath and reduce lower extremity edema prior to discharge        :

## 2020-02-16 NOTE — PROGRESS NOTES
02/15/20 2321   NIV Type   Skin Protection for O2 Device Yes   Equipment Type V60   Mode Bilevel   Mask Type Full face mask   Mask Size Medium   Settings/Measurements   IPAP 10 cmH20   CPAP/EPAP 5 cmH2O   Resp 28   FiO2  35 %   Vt Exhaled 346 mL   Minute Volume 9 Liters   Mask Leak (lpm) 0 lpm   Comfort Level Good   Using Accessory Muscles No   SpO2 96

## 2020-02-17 ENCOUNTER — APPOINTMENT (OUTPATIENT)
Dept: GENERAL RADIOLOGY | Age: 85
DRG: 292 | End: 2020-02-17
Payer: MEDICARE

## 2020-02-17 VITALS
DIASTOLIC BLOOD PRESSURE: 68 MMHG | OXYGEN SATURATION: 95 % | BODY MASS INDEX: 25 KG/M2 | WEIGHT: 159.3 LBS | HEIGHT: 67 IN | RESPIRATION RATE: 16 BRPM | SYSTOLIC BLOOD PRESSURE: 114 MMHG | TEMPERATURE: 97.9 F | HEART RATE: 87 BPM

## 2020-02-17 LAB — MAGNESIUM: 2.2 MG/DL (ref 1.8–2.4)

## 2020-02-17 PROCEDURE — 6360000002 HC RX W HCPCS: Performed by: INTERNAL MEDICINE

## 2020-02-17 PROCEDURE — 94761 N-INVAS EAR/PLS OXIMETRY MLT: CPT

## 2020-02-17 PROCEDURE — 6370000000 HC RX 637 (ALT 250 FOR IP): Performed by: NURSE PRACTITIONER

## 2020-02-17 PROCEDURE — 71045 X-RAY EXAM CHEST 1 VIEW: CPT

## 2020-02-17 PROCEDURE — 97116 GAIT TRAINING THERAPY: CPT

## 2020-02-17 PROCEDURE — 97110 THERAPEUTIC EXERCISES: CPT

## 2020-02-17 PROCEDURE — 94660 CPAP INITIATION&MGMT: CPT

## 2020-02-17 PROCEDURE — 36415 COLL VENOUS BLD VENIPUNCTURE: CPT

## 2020-02-17 PROCEDURE — 83735 ASSAY OF MAGNESIUM: CPT

## 2020-02-17 PROCEDURE — 2700000000 HC OXYGEN THERAPY PER DAY

## 2020-02-17 PROCEDURE — 6370000000 HC RX 637 (ALT 250 FOR IP): Performed by: INTERNAL MEDICINE

## 2020-02-17 PROCEDURE — 2580000003 HC RX 258: Performed by: INTERNAL MEDICINE

## 2020-02-17 RX ADMIN — Medication 324 MG: at 09:20

## 2020-02-17 RX ADMIN — FUROSEMIDE 40 MG: 10 INJECTION, SOLUTION INTRAMUSCULAR; INTRAVENOUS at 09:20

## 2020-02-17 RX ADMIN — ASPIRIN 81 MG: 81 TABLET, COATED ORAL at 09:20

## 2020-02-17 RX ADMIN — METOPROLOL TARTRATE 25 MG: 25 TABLET ORAL at 09:20

## 2020-02-17 RX ADMIN — ENOXAPARIN SODIUM 40 MG: 40 INJECTION SUBCUTANEOUS at 09:20

## 2020-02-17 RX ADMIN — Medication 1 TABLET: at 09:20

## 2020-02-17 RX ADMIN — PANTOPRAZOLE SODIUM 40 MG: 40 TABLET, DELAYED RELEASE ORAL at 07:05

## 2020-02-17 RX ADMIN — Medication 10 ML: at 09:21

## 2020-02-17 ASSESSMENT — PAIN SCALES - GENERAL
PAINLEVEL_OUTOF10: 0

## 2020-02-17 NOTE — DISCHARGE INSTR - COC
Continuity of Care Form    Patient Name: Mumtaz Machuca   :  1932  MRN:  [id number]    Admit date:  2020  Discharge date:  2020    Code Status Order: Full Code   Advance Directives:   [address] Directive Type of Healthcare Directive Copy in [address] Agent's Name Healthcare Agent's Phone Number    20  Yes, patient has an advance directive for healthcare treatment  Durable power of  for health care  No, copy requested from family  Healthcare power of   Jayne Candace  --    20  Yes, patient has an advance directive for healthcare treatment  --  --  --  --  --          Admitting Physician:  Robert Castro MD  PCP: Lawrence Richards MD    Discharging Nurse: [facility] Unit/Room#: 0219/0219-01  Discharging Unit Phone Number: ***    Emergency Contact:   Extended Emergency Contact Information  Primary Emergency Contact: Arnaldo Trivedi  Address: [place]           [id number]  Home Phone: [phone]  Relation: Child  Secondary Emergency Contact: Tigist Trivedi  Address:  [facility]           [id number]  Home Phone: [phone]  Mobile Phone: [phone]  Relation: Child    Past Surgical History:  Past Surgical History:   Procedure Laterality Date    CAROTID ENDARTERECTOMY Bilateral     CATARACT REMOVAL WITH IMPLANT Right 2016    CATARACT REMOVAL WITH IMPLANT Left 2016    COLONOSCOPY      COLONOSCOPY  2014    CORONARY ARTERY BYPASS GRAFT      HERNIA REPAIR      LAPAROSCOPY  2014    laparoscopic lysis of adhesions, excision of sigmoid epiploica    MITRAL VALVE REPLACEMENT      PACEMAKER INSERTION         Immunization History:   Immunization History   Administered Date(s) Administered    Influenza Virus Vaccine 2014    Pneumococcal Polysaccharide (Wrhredcev02) 2014       Active Catheter: None   Colostomy/Ileostomy/Ileal Conduit: No       Date of Last BM: 2/17/20    Intake/Output Summary (Last 24 hours) at 2/17/2020 1254  Last data filed at 2/17/2020 0920  Gross per 24 hour   Intake 690 ml   Output 1325 ml   Net -635 ml     I/O last 3 completed shifts: In: [address] [P.O.:1040]  Out: [address] [Urine:1475]    Safety Concerns: At Risk for Falls    Impairments/Disabilities:      None    Nutrition Therapy:  Current Nutrition Therapy:   - Oral Diet:  Low Sodium (2gm)    Routes of Feeding: Oral  Liquids: Thin Liquids  Daily Fluid Restriction: no  Last Modified Barium Swallow with Video (Video Swallowing Test): not done    Treatments at the Time of Hospital Discharge:   Respiratory Treatments: ***  Oxygen Therapy:  is not on home oxygen therapy.   Ventilator:    - No ventilator support    Rehab Therapies: Physical Therapy and Occupational Therapy  Weight Bearing Status/Restrictions: No weight bearing restirctions  Other Medical Equipment (for information only, NOT a DME order):  walker  Other Treatments: ***    Patient's personal belongings (please select all that are sent with patient):  None    RN SIGNATURE:  Electronically signed by Olayinka Ricci RN on 2/17/20 at 6:31 PM    CASE MANAGEMENT/SOCIAL WORK SECTION    Inpatient Status Date: ***    Readmission Risk Assessment Score:  Readmission Risk              Risk of Unplanned Readmission:        17           Discharging to Facility/ Agency   · Name: [facility]   · Address: [address] [place] [address]  · Phone:[phone]  · IDU:[id number]    Dialysis Facility (if applicable)   · Name:  · Address:  · Dialysis Schedule:  · Phone:  · Fax:    / signature: Electronically signed by FOSTER Waddell on 2/17/20 at 2:21 PM    PHYSICIAN SECTION    Prognosis: Good    Condition at Discharge: Stable    Rehab Potential (if transferring to Rehab): Good    Recommended Labs or Other Treatments After Discharge: None    Physician Certification: I certify the above information and transfer of Ama Renteria  is necessary for the continuing treatment of the diagnosis listed and that he requires [place] for less 30 days.      Update Admission H&P: No change in H&P    PHYSICIAN SIGNATURE:  Electronically signed by Nadeen Rivas MD on 2/17/20 at 12:54 PM

## 2020-02-17 NOTE — PLAN OF CARE
Problem: OXYGENATION/RESPIRATORY FUNCTION  Goal: Patient will achieve/maintain normal respiratory rate/effort  Description  Respiratory rate and effort will be within normal limits for the patient  Outcome: Ongoing     Patient's EF (Ejection Fraction) is greater than 40%    Heart Failure Medications:  Diuretics[de-identified] Furosemide    (One of the following REQUIRED for EF <40%/SYSTOLIC FAILURE but MAY be used in EF% >40%/DIASTOLIC FAILURE)        ACE[de-identified] None        ARB[de-identified] None         ARNI[de-identified] None    (Beta Blockers)  NON- Evidenced Based Beta Blocker (for EF% >40%/DIASTOLIC FAILURE): Metoprolol TARTrate- Lopressor    Evidenced Based Beta Blocker::(REQUIRED for EF% <40%/SYSTOLIC FAILURE) None  . .................................................................................................................................................. Patient's weights and intake/output reviewed: Yes    Patient's Last Weight: 160 lbs obtained by standing scale. Difference of 2 lbs less than last documented weight. Intake/Output Summary (Last 24 hours) at 2/16/2020 2051  Last data filed at 2/16/2020 1945  Gross per 24 hour   Intake 1080 ml   Output 1350 ml   Net -270 ml       Comorbidities Reviewed Yes    Patient has a past medical history of Abnormal stress test, CAD (coronary artery disease), GERD (gastroesophageal reflux disease), Hx of blood clots, and Hyperlipidemia. >>For CHF and Comorbidity documentation on Education Time and Topics, please see Education Tab    Progressive Mobility Assessment:  What is this patient's Current Level of Mobility?: Ambulatory-Up Ad Rachel  How was this patient Mobilized today?: Up to Chair,  Up to Toilet/Shower, and Up in Room                 With Whom? Nurse and PCA                 Level of Difficulty/Assistance: 1x Assist     Pt resting in bed at this time on 3 L O2. Pt denies shortness of breath. Pt without lower extremity edema.      Patient and/or Family's stated Goal of Care this Admission: increase activity tolerance, better understand heart failure and disease management, and be more comfortable prior to discharge        :

## 2020-02-17 NOTE — PROGRESS NOTES
02/17/20 0302   NIV Type   $NIV $Daily Charge   NIV Started/Stopped On   Equipment Type V60   Mode Bilevel   Mask Type Full face mask   Mask Size Medium   Bonnet size Medium   Settings/Measurements   IPAP 10 cmH20   CPAP/EPAP 5 cmH2O   Rate Ordered 6   Resp 26   FiO2  35 %   Vt Exhaled 335 mL   Minute Volume 10.5 Liters   Mask Leak (lpm) 0 lpm   Comfort Level Good   Using Accessory Muscles No   SpO2 96   Breath Sounds   Right Upper Lobe Diminished   Right Middle Lobe Diminished   Right Lower Lobe Diminished   Left Upper Lobe Diminished   Left Lower Lobe Diminished   Alarm Settings   Alarms On Y

## 2020-02-17 NOTE — PROGRESS NOTES
Pt ok for discharge per MD. Discharge instructions and no script given. IV removed. No questions or concerns at this time. Transported by stretcher to [facility]. Report given to Diana Simms RN. Faxed AVS to [facility] per request. No questions or concerns at this time. Call back number given.

## 2020-02-17 NOTE — DISCHARGE SUMMARY
nightly. esomeprazole (NEXIUM) 40 MG capsule Take 40 mg by mouth every morning (before breakfast). nitroGLYCERIN (NITROSTAT) 0.4 MG SL tablet Place 1 tablet under the tongue every 5 minutes as needed for Chest pain for 30 days. , Disp-25 tablet, R-1      aspirin 81 MG EC tablet Take 81 mg by mouth daily. Time Spent on discharge is more than 30 minutes in the examination, evaluation, counseling and review of medications and discharge plan. Signed:    Terri Ribeiro MD   2/17/2020      Thank you Skyler Chapin MD for the opportunity to be involved in this patient's care. If you have any questions or concerns please feel free to contact me at [phone].

## 2020-02-17 NOTE — PROGRESS NOTES
(02/17/20 1610)  Mobility Inpatient CMS G-Code Modifier : CK (02/17/20 1610)          Goals  Short term goals  Time Frame for Short term goals: 2/20/20  Short term goal 1: Pt will complete bed mobility independently. Short term goal 2: Pt will complete sit<>stand with LRAD and supervision. Short term goal 3: Pt will ambulate 150 ft with LRAD and supervision without LOB. Short term goal 4: Pt will tolerate 12-15 reps of LE exercises for strengthening by 2/18/20. Patient Goals   Patient goals : \"get rehab\"    Plan    Plan  Times per week: 3-5x/week  Plan weeks: 2/21/20  Specific instructions for Next Treatment: progress ambulation   Current Treatment Recommendations: Strengthening, Endurance Training, Transfer Training, Patient/Caregiver Education & Training, Balance Training, Gait Training, Home Exercise Program, Functional Mobility Training, Stair training, Safety Education & Training  Safety Devices  Type of devices:  All fall risk precautions in place, Gait belt, Patient at risk for falls, Nurse notified, Call light within reach, Left in chair, Chair alarm in place  Restraints  Initially in place: No     Therapy Time   Individual Concurrent Group Co-treatment   Time In 1520         Time Out 1550         Minutes 30         Timed Code Treatment Minutes: [address]

## 2020-02-17 NOTE — PROGRESS NOTES
02/16/20 2339   NIV Type   NIV Started/Stopped On   Equipment Type V60   Mode Bilevel   Mask Type Full face mask   Mask Size Medium   Bonnet size Medium   Settings/Measurements   IPAP 10 cmH20   CPAP/EPAP 5 cmH2O   Rate Ordered 6   Resp 21   FiO2  35 %   Vt Exhaled 378 mL   Minute Volume 12.6 Liters   Mask Leak (lpm) 0 lpm   Comfort Level Good   Using Accessory Muscles No   SpO2 96   Breath Sounds   Right Upper Lobe Diminished   Right Middle Lobe Diminished   Right Lower Lobe Diminished   Left Upper Lobe Diminished   Left Lower Lobe Diminished   Alarm Settings   Alarms On Y   Press Low Alarm 6 cmH2O   High Pressure Alarm 30 cmH2O   Delay Alarm 20 sec(s)   Resp Rate Low Alarm 6   High Respiratory Rate 40 br/min

## 2020-02-17 NOTE — CARE COORDINATION
CASE MANAGEMENT DISCHARGE SUMMARY      Discharge to: [facility]      Precertification completed: none  Hospital Exemption Notification (HENS) completed: 2/17/20    New Durable Medical Equipment ordered/agency: defer to SNF    Transportation:       Medical Transport explained to pt/family. family voice no agency preference. Agency used: [facility] [phone]   time: 615pm   Ambulance form completed: Yes    Confirmed discharge plan with: Oc Álvarez left a VM message     Patient: yes     Family, name and contact number: VM message left with Pt son Oc Álvarez. Facility/Agency, name: [facility]. [address] faxed   Phone number for report to facility: [phone]     RN, name: Pia Brennan    Note: Discharging nurse to complete EDIE, reconcile AVS, and place final copy with patient's discharge packet. RN to ensure that written prescriptions for  Level II medications are sent with patient to the facility as per protocol.

## 2020-02-17 NOTE — PROGRESS NOTES
Hospitalist Progress Note      PCP: Ulises Garcia MD    Date of Admission: 2/12/2020    Chief Complaint: SOB    Subjective:     No new c/o. Medications:  Reviewed    Infusion Medications   Scheduled Medications    aspirin  81 mg Oral Daily    pantoprazole  40 mg Oral QAM AC    metoprolol tartrate  25 mg Oral BID    simvastatin  40 mg Oral Nightly    sodium chloride flush  10 mL Intravenous 2 times per day    enoxaparin  40 mg Subcutaneous Daily    furosemide  40 mg Intravenous BID    ferrous gluconate  324 mg Oral Daily with breakfast    therapeutic multivitamin-minerals  1 tablet Oral Daily     PRN Meds: Lip Balm, melatonin, sodium chloride flush, magnesium hydroxide, ondansetron, acetaminophen, ipratropium-albuterol      Intake/Output Summary (Last 24 hours) at 2/17/2020 0817  Last data filed at 2/17/2020 0545  Gross per 24 hour   Intake 1040 ml   Output 1475 ml   Net -435 ml       Physical Exam Performed:    BP (!) 166/65   Pulse 121   Temp 98 °F (36.7 °C) (Axillary)   Resp 16   Ht 5' 7\" (1.702 m)   Wt 159 lb 4.8 oz (72.3 kg)   SpO2 97%   BMI 24.95 kg/m²     General appearance: Older male sitting up in chair, looks weak and tired, is alert and cooperative. HEENT: Pupils equal, round, and reactive to light. Conjunctivae/corneas clear. Neck: Supple, with full range of motion. No jugular venous distention. Trachea midline. Respiratory:  Normal respiratory effort. Clear to auscultation, bilaterally without Rales/Wheezes/Rhonchi. Cardiovascular: Regular rate and rhythm with normal S1/S2   Abdomen: Soft, non-tender, non-distended with + bowel sounds. Musculoskeletal: No clubbing, cyanosis , + 1 lower edema bilaterally. Neurologic:  Neurovascularly intact without any focal sensory/motor deficits.  Cranial nerves: II-XII intact, grossly non-focal.  Psychiatric: Alert and oriented, thought content appropriate, normal insight  Capillary Refill: Brisk,< 3 seconds   Peripheral Pulses: +2 above.     COPD - moderately severe w/ chronic respiratory failure on baseline home O2 at 2L.  Controlled on home medication regimen - continued.      HyperLipidemia - controlled on home Statin. Continue, w/ f/u and med adjustment w/ PCP     Report of blood in stool -  Son did report there may have been episodes of blood in stool. H/H is remained stable. FOBT negative. Will continue to follow serial labs. Reviewed and documented as above. Defer to further outpt f/u.        DVT Prophylaxis: LMWH  Diet: DIET LOW SODIUM 2 GM;  Code Status: Full Code      PT/OT Eval Status: seen w/ recs for SNF    Dispo - Medically stable for discharge to SNF Monday 17 Feb.     Makayla Gonzalez MD